# Patient Record
Sex: MALE | Race: WHITE
[De-identification: names, ages, dates, MRNs, and addresses within clinical notes are randomized per-mention and may not be internally consistent; named-entity substitution may affect disease eponyms.]

---

## 2018-04-05 ENCOUNTER — TRANSCRIPTION ENCOUNTER (OUTPATIENT)
Age: 43
End: 2018-04-05

## 2018-07-09 ENCOUNTER — EMERGENCY (EMERGENCY)
Facility: HOSPITAL | Age: 43
LOS: 0 days | Discharge: HOME | End: 2018-07-09
Attending: EMERGENCY MEDICINE | Admitting: EMERGENCY MEDICINE

## 2018-07-09 VITALS
OXYGEN SATURATION: 96 % | SYSTOLIC BLOOD PRESSURE: 140 MMHG | HEART RATE: 99 BPM | HEIGHT: 74 IN | WEIGHT: 229.94 LBS | DIASTOLIC BLOOD PRESSURE: 90 MMHG | TEMPERATURE: 96 F | RESPIRATION RATE: 20 BRPM

## 2018-07-09 VITALS
OXYGEN SATURATION: 98 % | SYSTOLIC BLOOD PRESSURE: 135 MMHG | DIASTOLIC BLOOD PRESSURE: 80 MMHG | RESPIRATION RATE: 18 BRPM

## 2018-07-09 DIAGNOSIS — F41.9 ANXIETY DISORDER, UNSPECIFIED: ICD-10-CM

## 2018-07-09 RX ORDER — HYDROXYZINE HCL 10 MG
2 TABLET ORAL
Qty: 20 | Refills: 0
Start: 2018-07-09 | End: 2018-07-11

## 2018-07-09 NOTE — ED PROVIDER NOTE - ATTENDING CONTRIBUTION TO CARE
I personally evaluated the patient. I reviewed the Resident’s or Physician Assistant’s note (as assigned above), and agree with the findings and plan except as documented in my note.  43y male no cardiac or PE RF with anxiety after coworker laid off, fears he is next, episodes assoc with tightness in chest and palpitations, no SI/HI/hallucinations, on exam vital signs appreciated, appears anxious, head nc/at, perrla, neck supple no goiter, cor rrr no m/r/g, lungs cta, abd snt, calves nontender, neuro itnact, EKG unremarkable, will d/c with vistaril to f/u with PMD for therapy referral. Patient counseled regarding conditions which should prompt return.

## 2018-07-09 NOTE — ED PROVIDER NOTE - NS ED ROS FT
Review of Systems:  	•	CONSTITUTIONAL - no fever, no diaphoresis, no chills  	•	SKIN - no rash  	•	HEMATOLOGIC - no bleeding, no bruising  	•	EYES - no eye pain, no blurry vision  	•	ENT - no congestion  	•	RESPIRATORY - no shortness of breath, no cough  	•	CARDIAC - no chest pain, + palpitations  	•	GI - no abd pain, no nausea, no vomiting, no diarrhea, no constipation  	•	GENITO-URINARY - no dysuria  	•	MUSCULOSKELETAL - no joint paint, no swelling, no redness  	•	NEUROLOGIC - no weakness, no headache, no paresthesias, no LOC  	•	PSYCH - + anxiety

## 2018-07-09 NOTE — ED ADULT TRIAGE NOTE - CHIEF COMPLAINT QUOTE
pt states he had anxiety attack , states after an incident at work 3 weeks ago he's having them frequently

## 2018-07-09 NOTE — ED PROVIDER NOTE - PHYSICAL EXAMINATION
VITAL SIGNS: I have reviewed nursing notes and confirm.  CONSTITUTIONAL: Well-developed; well-nourished; in no acute distress.  SKIN: Skin exam is warm and dry, no acute rash.  HEAD: Normocephalic; atraumatic.  EYES: PERRL, EOM intact; conjunctiva and sclera clear.  ENT: No nasal discharge; airway clear.   NECK: Supple; non tender.  CARD: S1, S2 normal; no murmurs, gallops, or rubs. Regular rate and rhythm.  RESP: Clear to auscultation bilaterally. No wheezes, rales or rhonchi.  ABD: Normal bowel sounds; soft; non-distended; non-tender.   EXT: Normal ROM. No edema. No calf tenderness.   LYMPH: No acute cervical adenopathy.  NEURO: Alert, oriented. Grossly unremarkable. No focal deficits.  PSYCH: Cooperative however appears very anxious.

## 2018-07-09 NOTE — ED PROVIDER NOTE - OBJECTIVE STATEMENT
44 yo M with no pmhx presenting with anxiety and palpations for the past 3 weeks. Patient states he started feeling this way after an incident at work 3 weeks ago. Patient states during the night he is not able to sleep. States that he felt as if his heart was racing prior to coming to ED. No cp, sob, fever, chills, abdominal pain, nausea, vomiting, diarrhea, back pain, urinary symptoms, headache, dizziness, paresthesias, or weakness. No leg swelling, calf pain, history of blood clots, hormonal use, hemoptysis, recent travel, or recent surgeries.

## 2021-01-26 ENCOUNTER — TRANSCRIPTION ENCOUNTER (OUTPATIENT)
Age: 46
End: 2021-01-26

## 2021-02-23 ENCOUNTER — TRANSCRIPTION ENCOUNTER (OUTPATIENT)
Age: 46
End: 2021-02-23

## 2021-08-19 ENCOUNTER — EMERGENCY (EMERGENCY)
Facility: HOSPITAL | Age: 46
LOS: 0 days | Discharge: HOME | End: 2021-08-19
Attending: EMERGENCY MEDICINE | Admitting: EMERGENCY MEDICINE
Payer: COMMERCIAL

## 2021-08-19 VITALS
HEART RATE: 68 BPM | OXYGEN SATURATION: 98 % | WEIGHT: 220.02 LBS | TEMPERATURE: 98 F | DIASTOLIC BLOOD PRESSURE: 79 MMHG | RESPIRATION RATE: 18 BRPM | SYSTOLIC BLOOD PRESSURE: 133 MMHG | HEIGHT: 74 IN

## 2021-08-19 DIAGNOSIS — F41.9 ANXIETY DISORDER, UNSPECIFIED: ICD-10-CM

## 2021-08-19 DIAGNOSIS — M79.661 PAIN IN RIGHT LOWER LEG: ICD-10-CM

## 2021-08-19 DIAGNOSIS — M54.5 LOW BACK PAIN: ICD-10-CM

## 2021-08-19 DIAGNOSIS — R26.89 OTHER ABNORMALITIES OF GAIT AND MOBILITY: ICD-10-CM

## 2021-08-19 PROCEDURE — 99284 EMERGENCY DEPT VISIT MOD MDM: CPT

## 2021-08-19 PROCEDURE — 93970 EXTREMITY STUDY: CPT | Mod: 26

## 2021-08-19 NOTE — ED PROVIDER NOTE - PLAN OF CARE
You were assessed for back and leg pain. A duplex of your Leg was negative for any clots. You will be discharged with a few days of anti-inflammatory medication. Please follow up with your primary doctor and neurosurgery

## 2021-08-19 NOTE — ED PROVIDER NOTE - PROGRESS NOTE DETAILS
ATTENDING NOTE: 47 y/o M with a tender R calf. Pt has a history of lumbar disc disease confirmed by MRI report reviews. There is calf tenderness without swelling. Distal n/v intact. + b/l straight leg raise test. + L sciatic notch tenderness. Doppler is negative. Stable for outpatient follow up.

## 2021-08-19 NOTE — ED PROVIDER NOTE - CARE PROVIDERS DIRECT ADDRESSES
[email or web address]

## 2021-08-19 NOTE — ED PROVIDER NOTE - PROVIDER TOKENS
PROVIDER:[TOKEN:[[id number]],FOLLOWUP:[4-6 Days]],PROVIDER:[TOKEN:[[id number]],FOLLOWUP:[7-10 Days]]

## 2021-08-19 NOTE — ED PROVIDER NOTE - PATIENT PORTAL LINK FT
You can access the FollowMyHealth Patient Portal offered by [facility] by registering at the following website: [email or web address]. By joining [employer]’s FollowMyHealth portal, you will also be able to view your health information using other applications (apps) compatible with our system.

## 2021-08-19 NOTE — ED PROVIDER NOTE - NS ED ROS FT
General: No fevers, chills, weight changes	  Skin/Breast: No skin rashes or wounds  Ophthalmologic: No blurry vision, double vision, recent changes in vision  ENMT: No difficulty hearing, ringing in ears, nasal discharge, throat pain, difficulty swallowing  Respiratory and Thorax: No coughing, wheezing, shortness of breath  Cardiovascular: No chest pain, palpitations  Gastrointestinal: No abdominal pain, constipation, diarrhea, nausea, vomiting  Genitourinary: No dysuria, polyuria, pyuria, hematuria  Musculoskeletal: No muscle aches or joint aches  Neurological: Right calf pain and tightness   Psychiatric: Regular mood  Hematology/Lymphatics: No easy bruising	  Endocrine: No hot or cold intolerance

## 2021-08-19 NOTE — ED PROVIDER NOTE - CARE PLAN
Principal Discharge DX:	Back pain  Assessment and plan of treatment:	You were assessed for back and leg pain. A duplex of your Leg was negative for any clots. You will be discharged with a few days of anti-inflammatory medication. Please follow up with your primary doctor and neurosurgery   1

## 2021-08-19 NOTE — ED PROVIDER NOTE - OBJECTIVE STATEMENT
This is a 46 year old male with PMHx of back pain who presented to the ED with 10 day history of Right calf pain. The patient states that a few days prior to this he had an MRI for his back pain which showed some L4-5 herniation and other bulges. The patient states that he has been having some pressure like pain in his Right calf. The patient states that the pain moves down his leg to his foot. Reports no difficulty in ambulating and no falls as a result of this. Remainder of review of systems otherwise negative.

## 2021-08-19 NOTE — ED PROVIDER NOTE - CARE PROVIDER_API CALL
Kizzy Jaramillo  FAMILY MEDICINE  [address] [place] [address]  Phone: [phone]  Fax: [phone]  Follow Up Time: 4-6 Days    Lois Berger)  Neurosurgery  [address] [place] [address], [address]  [place] [address]  Phone: [phone]  Fax: [phone]  Follow Up Time: 7-10 Days

## 2021-08-19 NOTE — ED PROVIDER NOTE - PHYSICAL EXAMINATION
GENERAL: NAD, well-developed  HEAD:  Atraumatic, Normocephalic  EYES: EOMI, PERRLA, conjunctiva and sclera clear  ENT:No nasal obstruction or discharge. No tonsillar exudate, swelling or erythema.  NECK: Supple, No JVD  CHEST/LUNG: Clear to auscultation bilaterally; No wheeze  HEART: Regular rate and rhythm; No murmurs, rubs, or gallops  ABDOMEN: Soft, Nontender, Nondistended; Bowel sounds present  EXTREMITIES:  2+ Peripheral Pulses, No clubbing, cyanosis, or edema. Right calf with no tightness but mild tenderness on palpation   PSYCH: AAOx3  NEUROLOGY: non-focal, sensation equal and intact bilaterally, cranial nerves ii-xii grossly intact, muscle strength 5/5 bilaterally  SKIN: No rashes or lesions

## 2021-09-08 ENCOUNTER — EMERGENCY (EMERGENCY)
Facility: HOSPITAL | Age: 46
LOS: 0 days | Discharge: HOME | End: 2021-09-08
Attending: STUDENT IN AN ORGANIZED HEALTH CARE EDUCATION/TRAINING PROGRAM | Admitting: STUDENT IN AN ORGANIZED HEALTH CARE EDUCATION/TRAINING PROGRAM
Payer: COMMERCIAL

## 2021-09-08 VITALS — WEIGHT: 220.02 LBS | HEIGHT: 74 IN

## 2021-09-08 VITALS
TEMPERATURE: 97 F | DIASTOLIC BLOOD PRESSURE: 70 MMHG | WEIGHT: 220.02 LBS | RESPIRATION RATE: 18 BRPM | HEIGHT: 74 IN | HEART RATE: 66 BPM | SYSTOLIC BLOOD PRESSURE: 130 MMHG | OXYGEN SATURATION: 98 %

## 2021-09-08 DIAGNOSIS — M79.662 PAIN IN LEFT LOWER LEG: ICD-10-CM

## 2021-09-08 DIAGNOSIS — F41.9 ANXIETY DISORDER, UNSPECIFIED: ICD-10-CM

## 2021-09-08 DIAGNOSIS — M54.9 DORSALGIA, UNSPECIFIED: ICD-10-CM

## 2021-09-08 DIAGNOSIS — M54.5 LOW BACK PAIN: ICD-10-CM

## 2021-09-08 PROCEDURE — 99284 EMERGENCY DEPT VISIT MOD MDM: CPT

## 2021-09-08 PROCEDURE — 93970 EXTREMITY STUDY: CPT | Mod: 26

## 2021-09-08 RX ORDER — KETOROLAC TROMETHAMINE 30 MG/ML
30 SYRINGE (ML) INJECTION ONCE
Refills: 0 | Status: DISCONTINUED | OUTPATIENT
Start: 2021-09-08 | End: 2021-09-08

## 2021-09-08 RX ORDER — DEXAMETHASONE 0.5 MG/5ML
10 ELIXIR ORAL ONCE
Refills: 0 | Status: COMPLETED | OUTPATIENT
Start: 2021-09-08 | End: 2021-09-08

## 2021-09-08 NOTE — ED PROVIDER NOTE - PATIENT PORTAL LINK FT
You can access the FollowMyHealth Patient Portal offered by [facility] by registering at the following website: [email or web address]. By joining [employer]’s FollowMyHealth portal, you will also be able to view your health information using other applications (apps) compatible with our system.

## 2021-09-08 NOTE — ED PROVIDER NOTE - ATTENDING CONTRIBUTION TO CARE
I personally evaluated the patient. I reviewed the Resident’s or Physician Assistant’s note (as assigned above), and agree with the findings and plan except as documented in my note.  46 year old male presents to the ED c/o back pain and leg pain. Patient has a history of herniation to L4/L5 and has been following with a chiropractor. Due to pain in the left leg patient was sent in by chiropractor to r/o dvt. Patient has not been taking any medication for pain, was  seen in the ed previously and sent on NSAIDS however did not  the prescription. No fever chills numbness/tingling urinary frequency urency burning saddle anesthesia or history of IVDA or trauma.  on exam  CONSTITUTIONAL: WA / WN / NAD  HEAD: NCAT  EYES: PERRL; EOMI;   ENT: Normal pharynx; mucous membranes pink/moist, no erythema.  NECK: Supple; te and effort are normal; breath sounds clear and equal bilaterally.  ABD: Soft, NT ND   MSK/EXT: No gross deformities; full range of motion. NO midline CTLS ttp b/ PSIS ttp. b/l distal pulses in tact.  SKIN: Warm and dry;   NEURO: AAOx3, Motor 5/5   PSYCH: Memory Intact, Normal Affect

## 2021-09-08 NOTE — ED PROVIDER NOTE - PHYSICAL EXAMINATION
--EXAM--  VITAL SIGNS: I have reviewed vs documented at present.  CONSTITUTIONAL: Well-developed; well-nourished; in no acute distress.     NECK: Supple; non tender.  CARD: S1, S2, Regular rate and rhythm.   RESP: No wheezes, rales or rhonchi.  back no midline spinal tenderness there is paraspinal tenderness lower left side . positive straight leg test on left    left calf no swelling no tenderness  NEURO: Alert, oriented, grossly unremarkable. Strength 5/5 in all extremities. Sensation intact throughout.  PSYCH: Cooperative, appropriate.

## 2021-09-08 NOTE — ED PROVIDER NOTE - NSFOLLOWUPCLINICS_GEN_ALL_ED_FT
[facility]  Medicine  [address], [place]   Phone: [phone]  Fax:

## 2021-09-08 NOTE — ED PROVIDER NOTE - NS ED ROS FT
Review of Systems:  	•	CONSTITUTIONAL - no fever, no diaphoresis, no chills  	•	SKIN - no rash    	•	RESPIRATORY - no shortness of breath, no cough  	•	CARDIAC - no chest pain, no palpitations    	•	MUSCULOSKELETAL - back pain  no joint paint, no swelling, no redness  	•	NEUROLOGIC - no weakness, no headache, no paresthesias, no LOC  	•	PSYCH - no anxiety, non suicidal, non homicidal, no hallucination, no depression

## 2021-09-08 NOTE — ED PROVIDER NOTE - CLINICAL SUMMARY MEDICAL DECISION MAKING FREE TEXT BOX
46 year old male presents to the ED c/o back pain and leg pain. Patient has a history of herniation to L4/L5 and has been following with a chiropractor. Due to pain in the left leg patient was sent in by chiropractor to r/o dvt. Patient has not been taking any medication for pain, was  seen in the ed previously and sent on NSAIDS however did not  the prescription. No fever chills numbness/tingling urinary frequency urgency burning saddle anesthesia or history of IVDA or trauma. VS reviewed pain medication given. Duplex negative. Patient a spoken to in detail about results  All questions addressed Strict Return precautions given.

## 2021-09-08 NOTE — ED PROVIDER NOTE - CARE PROVIDER_API CALL
Asif Colvin [facility]  [address] [address]  Phone: [phone]  Fax: [phone]  Follow Up Time:

## 2021-09-08 NOTE — ED PROVIDER NOTE - OBJECTIVE STATEMENT
this is 47 yo male presents to ed for evaluation of lower leg pain. patient has history of back pain and mri shows l4 to l5 herniation. patient states that he is under care of chiropractor and he was sent here to have ultrasound to r/o dvt. patient is due to see pain management  end of september.

## 2021-09-09 ENCOUNTER — INPATIENT (INPATIENT)
Facility: HOSPITAL | Age: 46
LOS: 3 days | Discharge: HOME | End: 2021-09-13
Attending: FAMILY MEDICINE | Admitting: FAMILY MEDICINE
Payer: COMMERCIAL

## 2021-09-09 VITALS
TEMPERATURE: 99 F | WEIGHT: 199.96 LBS | HEIGHT: 74 IN | HEART RATE: 74 BPM | RESPIRATION RATE: 18 BRPM | SYSTOLIC BLOOD PRESSURE: 145 MMHG | OXYGEN SATURATION: 99 % | DIASTOLIC BLOOD PRESSURE: 84 MMHG

## 2021-09-09 LAB
ALBUMIN SERPL ELPH-MCNC: 4.7 G/DL — SIGNIFICANT CHANGE UP (ref 3.5–5.2)
ALP SERPL-CCNC: 69 U/L — SIGNIFICANT CHANGE UP (ref 30–115)
ALT FLD-CCNC: 32 U/L — SIGNIFICANT CHANGE UP (ref 0–41)
ANION GAP SERPL CALC-SCNC: 12 MMOL/L — SIGNIFICANT CHANGE UP (ref 7–14)
AST SERPL-CCNC: 18 U/L — SIGNIFICANT CHANGE UP (ref 0–41)
BASOPHILS # BLD AUTO: 0.06 K/UL — SIGNIFICANT CHANGE UP (ref 0–0.2)
BASOPHILS NFR BLD AUTO: 0.6 % — SIGNIFICANT CHANGE UP (ref 0–1)
BILIRUB SERPL-MCNC: 0.4 MG/DL — SIGNIFICANT CHANGE UP (ref 0.2–1.2)
BUN SERPL-MCNC: 18 MG/DL — SIGNIFICANT CHANGE UP (ref 10–20)
CALCIUM SERPL-MCNC: 9.7 MG/DL — SIGNIFICANT CHANGE UP (ref 8.5–10.1)
CHLORIDE SERPL-SCNC: 105 MMOL/L — SIGNIFICANT CHANGE UP (ref 98–110)
CO2 SERPL-SCNC: 22 MMOL/L — SIGNIFICANT CHANGE UP (ref 17–32)
CREAT SERPL-MCNC: 0.8 MG/DL — SIGNIFICANT CHANGE UP (ref 0.7–1.5)
EOSINOPHIL # BLD AUTO: 0.49 K/UL — SIGNIFICANT CHANGE UP (ref 0–0.7)
EOSINOPHIL NFR BLD AUTO: 5.2 % — SIGNIFICANT CHANGE UP (ref 0–8)
GLUCOSE SERPL-MCNC: 84 MG/DL — SIGNIFICANT CHANGE UP (ref 70–99)
HCT VFR BLD CALC: 43.9 % — SIGNIFICANT CHANGE UP (ref 42–52)
HGB BLD-MCNC: 14.7 G/DL — SIGNIFICANT CHANGE UP (ref 14–18)
IMM GRANULOCYTES NFR BLD AUTO: 0.3 % — SIGNIFICANT CHANGE UP (ref 0.1–0.3)
LYMPHOCYTES # BLD AUTO: 1.77 K/UL — SIGNIFICANT CHANGE UP (ref 1.2–3.4)
LYMPHOCYTES # BLD AUTO: 18.8 % — LOW (ref 20.5–51.1)
MCHC RBC-ENTMCNC: 29.1 PG — SIGNIFICANT CHANGE UP (ref 27–31)
MCHC RBC-ENTMCNC: 33.5 G/DL — SIGNIFICANT CHANGE UP (ref 32–37)
MCV RBC AUTO: 86.8 FL — SIGNIFICANT CHANGE UP (ref 80–94)
MONOCYTES # BLD AUTO: 0.63 K/UL — HIGH (ref 0.1–0.6)
MONOCYTES NFR BLD AUTO: 6.7 % — SIGNIFICANT CHANGE UP (ref 1.7–9.3)
NEUTROPHILS # BLD AUTO: 6.41 K/UL — SIGNIFICANT CHANGE UP (ref 1.4–6.5)
NEUTROPHILS NFR BLD AUTO: 68.4 % — SIGNIFICANT CHANGE UP (ref 42.2–75.2)
NRBC # BLD: 0 /100 WBCS — SIGNIFICANT CHANGE UP (ref 0–0)
PLATELET # BLD AUTO: 267 K/UL — SIGNIFICANT CHANGE UP (ref 130–400)
POTASSIUM SERPL-MCNC: 4.2 MMOL/L — SIGNIFICANT CHANGE UP (ref 3.5–5)
POTASSIUM SERPL-SCNC: 4.2 MMOL/L — SIGNIFICANT CHANGE UP (ref 3.5–5)
PROT SERPL-MCNC: 6.7 G/DL — SIGNIFICANT CHANGE UP (ref 6–8)
RBC # BLD: 5.06 M/UL — SIGNIFICANT CHANGE UP (ref 4.7–6.1)
RBC # FLD: 13.2 % — SIGNIFICANT CHANGE UP (ref 11.5–14.5)
SARS-COV-2 RNA SPEC QL NAA+PROBE: SIGNIFICANT CHANGE UP
SODIUM SERPL-SCNC: 139 MMOL/L — SIGNIFICANT CHANGE UP (ref 135–146)
WBC # BLD: 9.39 K/UL — SIGNIFICANT CHANGE UP (ref 4.8–10.8)
WBC # FLD AUTO: 9.39 K/UL — SIGNIFICANT CHANGE UP (ref 4.8–10.8)

## 2021-09-09 PROCEDURE — 72110 X-RAY EXAM L-2 SPINE 4/>VWS: CPT | Mod: 26

## 2021-09-09 PROCEDURE — 99285 EMERGENCY DEPT VISIT HI MDM: CPT

## 2021-09-09 PROCEDURE — 99223 1ST HOSP IP/OBS HIGH 75: CPT

## 2021-09-09 RX ORDER — INFLUENZA VIRUS VACCINE 15; 15; 15; 15 UG/.5ML; UG/.5ML; UG/.5ML; UG/.5ML
0.5 SUSPENSION INTRAMUSCULAR ONCE
Refills: 0 | Status: COMPLETED | OUTPATIENT
Start: 2021-09-09 | End: 2021-09-09

## 2021-09-09 RX ORDER — TRAMADOL HYDROCHLORIDE 50 MG/1
50 TABLET ORAL EVERY 8 HOURS
Refills: 0 | Status: DISCONTINUED | OUTPATIENT
Start: 2021-09-09 | End: 2021-09-13

## 2021-09-09 RX ORDER — CHLORHEXIDINE GLUCONATE 213 G/1000ML
1 SOLUTION TOPICAL
Refills: 0 | Status: DISCONTINUED | OUTPATIENT
Start: 2021-09-09 | End: 2021-09-13

## 2021-09-09 RX ORDER — ENOXAPARIN SODIUM 100 MG/ML
40 INJECTION SUBCUTANEOUS DAILY
Refills: 0 | Status: DISCONTINUED | OUTPATIENT
Start: 2021-09-09 | End: 2021-09-13

## 2021-09-09 RX ORDER — KETOROLAC TROMETHAMINE 30 MG/ML
30 SYRINGE (ML) INJECTION ONCE
Refills: 0 | Status: DISCONTINUED | OUTPATIENT
Start: 2021-09-09 | End: 2021-09-09

## 2021-09-09 RX ORDER — ACETAMINOPHEN 500 MG
650 TABLET ORAL EVERY 6 HOURS
Refills: 0 | Status: DISCONTINUED | OUTPATIENT
Start: 2021-09-09 | End: 2021-09-13

## 2021-09-09 RX ADMIN — Medication 30 MILLIGRAM(S): at 09:49

## 2021-09-09 RX ADMIN — Medication 40 MILLIGRAM(S): at 16:17

## 2021-09-09 RX ADMIN — Medication 650 MILLIGRAM(S): at 20:00

## 2021-09-09 RX ADMIN — Medication 650 MILLIGRAM(S): at 19:26

## 2021-09-09 NOTE — PATIENT PROFILE ADULT - CHOOSE INDICATION TO IMMUNIZE (AN ORDER WILL BE GENERATED WHEN THIS NOTE IS SAVED):
Date: 6/24/2024    Patient Name: Bryant Mariscal          To Whom it may concern:    This letter has been written at the patient's request. The above patient was seen at [facility] for treatment of a medical condition.    May use the left upper extremity with 5 lb restriction. No driving the work vehicle at this time.     Sincerely,        Ede Sarmiento MD  Knee, Shoulder, & Elbow Surgery / Sports Medicine Specialist  Orthopaedic Surgery  [address] [place] [address].[email or web address]  [email or web address]  t: [phone]  o: [phone]  f: [phone]      
Patient is not pregnant (male or female)

## 2021-09-09 NOTE — ED PROVIDER NOTE - NS ED MD EM SELECTION
I have reviewed and confirmed nurses' notes for patient's medications, allergies, medical history, and surgical history. [id number] Detailed

## 2021-09-09 NOTE — ED PROVIDER NOTE - NS ED ROS FT
Constitutional: (-) fever  Eyes/ENT: (-) blurry vision, (-) epistaxis  Cardiovascular: (-) chest pain, (-) syncope  Respiratory: (-) cough, (-) shortness of breath  Gastrointestinal: (-) vomiting, (-) diarrhea  Musculoskeletal: (-) neck pain, see HPI  Integumentary: (-) rash, (-) edema  Neurological: (-) headache, (-) altered mental status  Psychiatric: (-) hallucinations  Allergic/Immunologic: (-) pruritus

## 2021-09-09 NOTE — ED ADULT NURSE NOTE - NSIMPLEMENTINTERV_GEN_ALL_ED
Ninfa Macaroi MD  Signing Provider: MD ROLAND Garza/RED ([id number])  DD: 06/19/2017 07:52:48 TD: 06/20/2017 02:47:42    Copy Sent To:   
Implemented All Universal Safety Interventions:  Aladdin to call system. Call bell, personal items and telephone within reach. Instruct patient to call for assistance. Room bathroom lighting operational. Non-slip footwear when patient is off stretcher. Physically safe environment: no spills, clutter or unnecessary equipment. Stretcher in lowest position, wheels locked, appropriate side rails in place.

## 2021-09-09 NOTE — ED PROVIDER NOTE - PROGRESS NOTE DETAILS
patient refuses morphine, unable to ambulate, plan for admission, will start with toradol (last too alleve last night) pt unable to ambulate, will admit for rehab

## 2021-09-09 NOTE — H&P ADULT - HISTORY OF PRESENT ILLNESS
A 47 y/o male with pmhx of lower back pain/sciatica , herniated disc L4-L5  presents with lower back and left leg pain since Monday. Pt reports pain started while he was sitting , initially on the lower back, then left gluteus and now mostly on left lower extremity. Pt reports pain mostly with ambulation when he puts his foot down. Pt reports numbness on his left foot to all toes. PT denies weakness. Pt reports can not straighten his left knee due to pain. Pt denies hx of injury. Pt had MRI on July 2021 showed L4-L5 herniated disc and followed up with a chiropractor. Pt has been taking Tylenol and has been helping. Pt now is not able to ambulate and straighten up. Pt denies seeing PCP in a year. Pt preferred not to disclose what he works, reports , sitting a lot , no heavy lifting. PT seen in ED yesterday for calf pain and August 19 , duplex was negative for DVT Pt denies fever, chills, chest pain, shortness of breath, burning with urination, diarrhea.

## 2021-09-09 NOTE — ED PROVIDER NOTE - CLINICAL SUMMARY MEDICAL DECISION MAKING FREE TEXT BOX
pt with h/o lumbar disc dz with pain mostly in left calf, referred, unable to ambulate, neurologically intact

## 2021-09-09 NOTE — H&P ADULT - NSHPPHYSICALEXAM_GEN_ALL_CORE
VITALS:     ICU Vital Signs Last 24 Hrs  T(C): 37.1 (09 Sep 2021 08:53), Max: 37.1 (09 Sep 2021 08:53)  T(F): 98.8 (09 Sep 2021 08:53), Max: 98.8 (09 Sep 2021 08:53)  HR: 74 (09 Sep 2021 08:53) (74 - 74)  BP: 145/84 (09 Sep 2021 08:53) (145/84 - 145/84)  RR: 18 (09 Sep 2021 08:53) (18 - 18)  SpO2: 99% (09 Sep 2021 08:53) (99% - 99%)        GENERAL: NAD, sitting in chair   HEAD:  Atraumatic, Normocephalic  EYES: EOMI, PERRLA, conjunctiva and sclera clear  ENT: Moist mucous membranes  NECK: Supple, No JVD  CHEST/LUNG: Clear to auscultation bilaterally; No rales, rhonchi, wheezing, or rubs.   HEART: Regular rate and rhythm; No murmurs, rubs, or gallops  ABDOMEN:  Soft, Nontender, Nondistended. No hepatomegaly  Back: no tenderness to palpation, no sign of injury, no palpable mass   EXTREMITIES: pt unable to extend left leg completely, left leg strenght 5/5, pt able to press with foot, wiggle toes, sensation intact, DP/PT present  RLE exam intact. Pt reports back pain with right leg straight leg rise. no calf tenderness, edema. Left shin bruising, tender to palpation 3 x 3 cm  NERVOUS SYSTEM:  Alert & Oriented X3, speech clear. No deficits   MSK: FROM all 4 extremities, full and equal strength  SKIN: No rashes or lesions VITALS:     ICU Vital Signs Last 24 Hrs  T(C): 37.1 (09 Sep 2021 08:53), Max: 37.1 (09 Sep 2021 08:53)  T(F): 98.8 (09 Sep 2021 08:53), Max: 98.8 (09 Sep 2021 08:53)  HR: 74 (09 Sep 2021 08:53) (74 - 74)  BP: 145/84 (09 Sep 2021 08:53) (145/84 - 145/84)  RR: 18 (09 Sep 2021 08:53) (18 - 18)  SpO2: 99% (09 Sep 2021 08:53) (99% - 99%)        GENERAL: NAD, sitting in chair   HEAD:  Atraumatic, Normocephalic  EYES: EOMI, PERRLA, conjunctiva and sclera clear  ENT: Moist mucous membranes  NECK: Supple, No JVD  CHEST/LUNG: Clear to auscultation bilaterally; No rales, rhonchi, wheezing, or rubs.   HEART: Regular rate and rhythm; No murmurs, rubs, or gallops  ABDOMEN:  Soft, Nontender, Nondistended. No hepatomegaly  Back: no tenderness to palpation, no sign of injury, no palpable mass   EXTREMITIES: pt unable to extend left leg completely, left leg strenght 5/5, pt able to press with foot, wiggle toes, sensation intact, DP/PT present  RLE exam intact. Pt reports back pain with right leg straight leg rise. no calf tenderness, edema. Left shin bruising, tender to palpation 3 x 3 cm  NERVOUS SYSTEM:  Alert & Oriented X3, speech clear. No deficits. +motor/sensory intact  MSK: FROM all 4 extremities, full and equal strength  SKIN: No rashes or lesions

## 2021-09-09 NOTE — ED PROVIDER NOTE - OBJECTIVE STATEMENT
46y male poor historian chart review reveals h/o herniated discs pending pain management irina seen yesterday for calf pain had negative duplex given naproxen apparently offered admission for pain control but discharged returns for admission, unable to bear weight on LLE, has intermittent RLE pain and left low back pain as well, no bowel/bladder symptoms, no fever

## 2021-09-09 NOTE — H&P ADULT - NSHPLABSRESULTS_GEN_ALL_CORE
14.7   9.39  )-----------( 267      ( 09 Sep 2021 09:45 )             43.9       09-09    139  |  105  |  18  ----------------------------<  84  4.2   |  22  |  0.8    Ca    9.7      09 Sep 2021 09:45    TPro  6.7  /  Alb  4.7  /  TBili  0.4  /  DBili  x   /  AST  18  /  ALT  32  /  AlkPhos  69  09-09        < from: Xray Lumbosacral Spine (09.09.21 @ 12:46) >        < end of copied text >    < from: [facility] Duplex Lower Ext Vein Scan, Bilat (09.08.21 @ 10:32) >    Impression:    No evidence of deep venous thrombosis or superficial thrombophlebitis in the bilateral lower extremities.    ICD-10:M79.89    --- End of Report ---      < from: Xray Lumbosacral Spine (09.09.21 @ 12:46) >    impression:    No evidence of compression deformity or subluxation.    Disc space narrowing at L4-L5 and L5-S1.    Multilevel osteophyte formation.    --- End of Report ---        MARYANNE JOAQUIN MD; Attending Radiologist  This document has been electronically signed. Sep  9 2021  1:17PM    < end of copied text >

## 2021-09-09 NOTE — ED PROVIDER NOTE - PHYSICAL EXAMINATION
Vital Signs: I have reviewed the initial vital signs.  Constitutional: well-nourished, appears stated age, no acute distress  HEENT: NC/AT, PERRLA, EOMI, conjunctivae pink, sclerae anicteric, mmm   Neck: supple,  no goiter, no meningismus  Cardiovascular: RRR no m/r/g  Respiratory: CTA no w/r/r  Gastrointestinal: +bs, snt/nd  Musculoskeletal: FROM x 4 with ttp left calf, no sciatic ttp today but +contralateral SLR, NVI  Integumentary: warm, dry, no rash  Neurologic: awake, alert, cranial nerves II-XII grossly intact, extremities’ motor and sensory functions grossly intact  Psychiatric: appropriate mood, appropriate affect

## 2021-09-09 NOTE — PATIENT PROFILE ADULT - LIVING ENVIRONMENT
Contacted. Information provided. Placed on Hold with 9 cases ahead of this one. I could not continue to hold any longer over 15mins now. Will attempt to leave a message for them to get back to me at another time. no

## 2021-09-10 LAB
ANION GAP SERPL CALC-SCNC: 12 MMOL/L — SIGNIFICANT CHANGE UP (ref 7–14)
BUN SERPL-MCNC: 12 MG/DL — SIGNIFICANT CHANGE UP (ref 10–20)
CALCIUM SERPL-MCNC: 9.7 MG/DL — SIGNIFICANT CHANGE UP (ref 8.5–10.1)
CHLORIDE SERPL-SCNC: 103 MMOL/L — SIGNIFICANT CHANGE UP (ref 98–110)
CO2 SERPL-SCNC: 26 MMOL/L — SIGNIFICANT CHANGE UP (ref 17–32)
CREAT SERPL-MCNC: 0.8 MG/DL — SIGNIFICANT CHANGE UP (ref 0.7–1.5)
GLUCOSE SERPL-MCNC: 78 MG/DL — SIGNIFICANT CHANGE UP (ref 70–99)
HCT VFR BLD CALC: 43.3 % — SIGNIFICANT CHANGE UP (ref 42–52)
HGB BLD-MCNC: 14.1 G/DL — SIGNIFICANT CHANGE UP (ref 14–18)
MCHC RBC-ENTMCNC: 28.8 PG — SIGNIFICANT CHANGE UP (ref 27–31)
MCHC RBC-ENTMCNC: 32.6 G/DL — SIGNIFICANT CHANGE UP (ref 32–37)
MCV RBC AUTO: 88.4 FL — SIGNIFICANT CHANGE UP (ref 80–94)
NRBC # BLD: 0 /100 WBCS — SIGNIFICANT CHANGE UP (ref 0–0)
PLATELET # BLD AUTO: 262 K/UL — SIGNIFICANT CHANGE UP (ref 130–400)
POTASSIUM SERPL-MCNC: 4 MMOL/L — SIGNIFICANT CHANGE UP (ref 3.5–5)
POTASSIUM SERPL-SCNC: 4 MMOL/L — SIGNIFICANT CHANGE UP (ref 3.5–5)
RBC # BLD: 4.9 M/UL — SIGNIFICANT CHANGE UP (ref 4.7–6.1)
RBC # FLD: 13.1 % — SIGNIFICANT CHANGE UP (ref 11.5–14.5)
SODIUM SERPL-SCNC: 141 MMOL/L — SIGNIFICANT CHANGE UP (ref 135–146)
WBC # BLD: 9.85 K/UL — SIGNIFICANT CHANGE UP (ref 4.8–10.8)
WBC # FLD AUTO: 9.85 K/UL — SIGNIFICANT CHANGE UP (ref 4.8–10.8)

## 2021-09-10 PROCEDURE — 73600 X-RAY EXAM OF ANKLE: CPT | Mod: 26,LT

## 2021-09-10 PROCEDURE — 99232 SBSQ HOSP IP/OBS MODERATE 35: CPT

## 2021-09-10 PROCEDURE — 73590 X-RAY EXAM OF LOWER LEG: CPT | Mod: 26,LT

## 2021-09-10 PROCEDURE — 72148 MRI LUMBAR SPINE W/O DYE: CPT | Mod: 26

## 2021-09-10 RX ADMIN — Medication 650 MILLIGRAM(S): at 02:55

## 2021-09-10 RX ADMIN — TRAMADOL HYDROCHLORIDE 50 MILLIGRAM(S): 50 TABLET ORAL at 06:26

## 2021-09-10 RX ADMIN — TRAMADOL HYDROCHLORIDE 50 MILLIGRAM(S): 50 TABLET ORAL at 22:37

## 2021-09-10 RX ADMIN — TRAMADOL HYDROCHLORIDE 50 MILLIGRAM(S): 50 TABLET ORAL at 21:49

## 2021-09-10 RX ADMIN — Medication 650 MILLIGRAM(S): at 02:25

## 2021-09-10 RX ADMIN — Medication 650 MILLIGRAM(S): at 09:39

## 2021-09-10 RX ADMIN — TRAMADOL HYDROCHLORIDE 50 MILLIGRAM(S): 50 TABLET ORAL at 04:17

## 2021-09-10 RX ADMIN — TRAMADOL HYDROCHLORIDE 50 MILLIGRAM(S): 50 TABLET ORAL at 13:32

## 2021-09-10 RX ADMIN — Medication 650 MILLIGRAM(S): at 10:09

## 2021-09-10 RX ADMIN — ENOXAPARIN SODIUM 40 MILLIGRAM(S): 100 INJECTION SUBCUTANEOUS at 13:00

## 2021-09-10 RX ADMIN — TRAMADOL HYDROCHLORIDE 50 MILLIGRAM(S): 50 TABLET ORAL at 13:02

## 2021-09-10 NOTE — PHYSICAL THERAPY INITIAL EVALUATION ADULT - ADDITIONAL COMMENTS
Pt reports he lives with mother in an apartment - 5 steps with rail to enter. Pt states he has history of sciatic/back pain but it usually resolves itself. Pt sees chiropractor regularly. Pt has a RW/SC from a family member.

## 2021-09-10 NOTE — PHYSICAL THERAPY INITIAL EVALUATION ADULT - GAIT DEVIATIONS NOTED, PT EVAL
forward flexed trunk, dec WB on LLE - only on toes/forefoot - LLE externally rotating ambulating on flexed L knee,/decreased nadeen/decreased step length/decreased stride length/decreased weight-shifting ability

## 2021-09-10 NOTE — PHYSICAL THERAPY INITIAL EVALUATION ADULT - GENERAL OBSERVATIONS, REHAB EVAL
Pt encountered semi-reclined in bed, NAD - Dr. Bean and Dr. Alves requesting pt be seen before MRI - no acute fx or trauma so cleared for PT. Pt agreeable; chart reviewed

## 2021-09-11 LAB
COVID-19 SPIKE DOMAIN AB INTERP: POSITIVE
COVID-19 SPIKE DOMAIN ANTIBODY RESULT: >250 U/ML — HIGH
SARS-COV-2 IGG+IGM SERPL QL IA: >250 U/ML — HIGH
SARS-COV-2 IGG+IGM SERPL QL IA: POSITIVE

## 2021-09-11 PROCEDURE — 99232 SBSQ HOSP IP/OBS MODERATE 35: CPT

## 2021-09-11 RX ADMIN — Medication 40 MILLIGRAM(S): at 12:13

## 2021-09-11 RX ADMIN — ENOXAPARIN SODIUM 40 MILLIGRAM(S): 100 INJECTION SUBCUTANEOUS at 12:13

## 2021-09-11 RX ADMIN — Medication 650 MILLIGRAM(S): at 06:50

## 2021-09-11 RX ADMIN — Medication 650 MILLIGRAM(S): at 06:04

## 2021-09-11 NOTE — CONSULT NOTE ADULT - SUBJECTIVE AND OBJECTIVE BOX
NEUROSURGERY CONSULT   KAYLYNN KHALIL   09-10-21 @ 18:29    HPI: A 47 y/o male with pmhx of lower back pain/sciatica , herniated disc L4-L5  presents with lower back and left leg pain since Monday. Pt reports pain started while he was sitting , initially on the lower back, then left gluteus and now mostly on left lower extremity. Pt reports pain mostly with ambulation when he puts his foot down. Pt reports numbness on his left foot to all toes. PT denies weakness. Pt reports can not straighten his left knee due to pain. Pt denies hx of injury. Pt had MRI on July 2021 showed L4-L5 herniated disc and followed up with a chiropractor. Pt has been taking Tylenol and has been helping. Pt now is not able to ambulate and straighten up. Pt denies seeing PCP in a year. Pt preferred not to disclose what he works, reports , sitting a lot , no heavy lifting. PT seen in ED yesterday for calf pain and August 19 , duplex was negative for DVT Pt denies fever, chills, chest pain, shortness of breath, burning with urination, diarrhea.     Neurosurgery was consulted on patient with known L4-L5 herniated disc from outpatient MRI seen by chiropractor. Patient states that pain started Monday, not brought on by any particular movement, and continues to have worsening pain with ambulation and movement now. Patient states that he has Lower back pain that radiates to his posterior left calf. States that he has normal sensation, and denies any urinary / bowel incontinence. Patient states that he would not like surgery and would like to try conservative management, and follow up outpatient.     RADIOLOGY:   Awaiting MRI results     MEDS:   acetaminophen   Tablet .. 650 milliGRAM(s) Oral every 6 hours PRN  chlorhexidine 4% Liquid 1 Application(s) Topical <User Schedule>  enoxaparin Injectable 40 milliGRAM(s) SubCutaneous daily  predniSONE   Tablet 50 milliGRAM(s) Oral once  traMADol 50 milliGRAM(s) Oral every 8 hours PRN      PHYSICAL EXAM:  Awake, alert, following commands   PERRL  B/L UE 5/5   RLE 5/5   L HF (+) SLR pain limited    L KF 4/5   L PF/DR/EHL- 4+/5 pain limited   SILT     Vital Signs Last 24 Hrs  T(C): 35.9 (10 Sep 2021 14:53), Max: 36.1 (09 Sep 2021 21:06)  T(F): 96.7 (10 Sep 2021 14:53), Max: 96.9 (09 Sep 2021 21:06)  HR: 64 (10 Sep 2021 14:53) (61 - 65)  BP: 97/69 (10 Sep 2021 14:53) (97/69 - 138/80)  BP(mean): --  RR: 16 (10 Sep 2021 14:53) (16 - 16)  SpO2: 97% (10 Sep 2021 13:36) (97% - 97%)      LABS:  [id number] @ 18:29                        14.1   9.85  )-----------( 262      ( 10 Sep 2021 07:05 )             43.3     09-10    141  |  103  |  12  ----------------------------<  78  4.0   |  26  |  0.8    Ca    9.7      10 Sep 2021 07:05    TPro  6.7  /  Alb  4.7  /  TBili  0.4  /  DBili  x   /  AST  18  /  ALT  32  /  AlkPhos  69  09-09            
   History of Present Illness:  Reason for Admission: lower back pain, left leg pain  History of Present Illness:   A 47 y/o male with pmhx of lower back pain/sciatica , herniated disc L4-L5  presents with lower back and left leg pain since Monday. Pt reports pain started while he was sitting , initially on the lower back, then left gluteus and now mostly on left lower extremity. Pt reports pain mostly with ambulation when he puts his foot down. Pt reports numbness on his left foot to all toes. PT denies weakness. Pt reports can not straighten his left knee due to pain. Pt denies hx of injury. Pt had MRI on July 2021 showed L4-L5 herniated disc and followed up with a chiropractor. Pt has been taking Tylenol and has been helping. Pt now is not able to ambulate and straighten up. Pt denies seeing PCP in a year. Pt preferred not to disclose what he works, reports , sitting a lot , no heavy lifting. PT seen in ED yesterday for calf pain and August 19 , duplex was negative for DVT Pt denies fever, chills, chest pain, shortness of breath, burning with urination, diarrhea.      Review of Systems:  Other Review of Systems: All other review of systems negative, except as noted in HPI      Allergies and Intolerances:        Allergies:  	No Known Allergies:     Home Medications:   * Patient Currently Takes Medications as of 08-Sep-2021 11:57 documented in Structured Notes  · 	naproxen 500 mg oral tablet: 1 tab(s) orally 2 times a day     Patient History:    Past Medical, Past Surgical, and Family History:  PAST MEDICAL HISTORY:  Anxiety     Lower back pain.     PAST SURGICAL HISTORY:  No significant past surgical history.     Social History:  Social History (marital status, living situation, occupation, tobacco use, alcohol and drug use, and sexual history): Tobacco use: denies   EtOH use: denies   Illicit drug use: denies     Tobacco Screening:  · Core Measure Site	No  · Has the patient used tobacco in the past 30 days?	No    Risk Assessment:    Present on Admission:  Deep Venous Thrombosis	no  Pulmonary Embolus	no     Heart Failure:  Does this patient have a history of or has been diagnosed with heart failure? no.    HIV Screen (per Health system Department of Health, HIV screening must be offered to every individual between ages 13 and 64)	Offered and patient declined    Physical Exam:   Physical Exam: VITALS:     ICU Vital Signs Last 24 Hrs  T(C): 37.1 (09 Sep 2021 08:53), Max: 37.1 (09 Sep 2021 08:53)  T(F): 98.8 (09 Sep 2021 08:53), Max: 98.8 (09 Sep 2021 08:53)  HR: 74 (09 Sep 2021 08:53) (74 - 74)  BP: 145/84 (09 Sep 2021 08:53) (145/84 - 145/84)  RR: 18 (09 Sep 2021 08:53) (18 - 18)  SpO2: 99% (09 Sep 2021 08:53) (99% - 99%)        GENERAL: NAD, sitting in chair   HEAD:  Atraumatic, Normocephalic  EYES: EOMI, PERRLA, conjunctiva and sclera clear  ENT: Moist mucous membranes  NECK: Supple, No JVD  CHEST/LUNG: Clear to auscultation bilaterally; No rales, rhonchi, wheezing, or rubs.   HEART: Regular rate and rhythm; No murmurs, rubs, or gallops  ABDOMEN:  Soft, Nontender, Nondistended. No hepatomegaly  Back: no tenderness to palpation, no sign of injury, no palpable mass   EXTREMITIES: pt unable to extend left leg completely, left leg strenght 5/5, pt able to press with foot, wiggle toes, sensation intact, DP/PT present  RLE exam intact. Pt reports back pain with right leg straight leg rise. no calf tenderness, edema. Left shin bruising, tender to palpation 3 x 3 cm  NERVOUS SYSTEM:  Alert & Oriented X3, speech clear. No deficits. +motor/sensory intact  MSK: FROM all 4 extremities, full and equal strength  SKIN: No rashes or lesions         Labs and Results:  Labs, Radiology, Cardiology, and Other Results: 14.7   9.39  )-----------( 267      ( 09 Sep 2021 09:45 )             43.9       09-09    139  |  105  |  18  ----------------------------<  84  4.2   |  22  |  0.8    Ca    9.7      09 Sep 2021 09:45    TPro  6.7  /  Alb  4.7  /  TBili  0.4  /  DBili  x   /  AST  18  /  ALT  32  /  AlkPhos  69  09-09        < from: Xray Lumbosacral Spine (09.09.21 @ 12:46) >        < end of copied text >    < from: [facility] Duplex Lower Ext Vein Scan, Bilat (09.08.21 @ 10:32) >    Impression:consult for lt ankle pain ,xray with old chip fx distal fibula 2mm,  no swelling ,no erythema ,neg anterior draw, good ROM, good dorsal and planter flexion       recom: wt bear as rodrigue ,pain in leg is from lumbar referred down LE, f/u 4 weeks with foot and ankle specialist              < end of copied text >

## 2021-09-12 PROCEDURE — 99232 SBSQ HOSP IP/OBS MODERATE 35: CPT

## 2021-09-12 RX ADMIN — Medication 40 MILLIGRAM(S): at 14:13

## 2021-09-12 RX ADMIN — ENOXAPARIN SODIUM 40 MILLIGRAM(S): 100 INJECTION SUBCUTANEOUS at 12:14

## 2021-09-12 RX ADMIN — TRAMADOL HYDROCHLORIDE 50 MILLIGRAM(S): 50 TABLET ORAL at 16:45

## 2021-09-12 RX ADMIN — Medication 650 MILLIGRAM(S): at 10:09

## 2021-09-12 NOTE — PROGRESS NOTE ADULT - ASSESSMENT
A 45 y/o male with pmhx of lower back pain/sciatica , herniated disc L4-L5  presents with lower back and left leg pain since Monday.    #lower back pain /sciatica   xray lumbar -No evidence of compression deformity or subluxation  Disc space narrowing at L4-L5 and L5-S1.  -pain meds  -prednisone   -MRI lumbar noticed.  prednisone taper as per neurosurgery  -NSAID  -x-ray of the ankle, and tib-fib left shows Chronic nondisplaced fracture deformity of the distal fibular tip. Degenerative changes of the hindfoot and midfoot joints. ortho consult   -PT consult 
A 47 y/o male with pmhx of lower back pain/sciatica , herniated disc L4-L5  presents with lower back and left leg pain since Monday.    #lower back pain /sciatica   xray lumbar -No evidence of compression deformity or subluxation  Disc space narrowing at L4-L5 and L5-S1.  -pain meds  -prednisone   -MRI lumbar noticed.  prednisone taper as per neurosurgery  -Offered surgery to patient but refused with full understanding of risks, benefits, and alternatives which were discussed in details using simple words with mother, and patient.  risks, includes but not limited to worsen pain, symptoms, foot drop, lost sensation, paralysis, disability, and death.  seems to understand those risks, and prefers to follow up with neurosurgery as outpatient  -NSAID  -x-ray of the ankle, and tib-fib left shows Chronic nondisplaced fracture deformity of the distal fibular tip. Degenerative changes of the hindfoot and midfoot joints. ortho recommended outpatient follow up    -PT consult     refuses rehab, all risks, benefits, and alternatives discussed  will be anticipated for discharge tomorrow
A 47 y/o male with pmhx of lower back pain/sciatica , herniated disc L4-L5  presents with lower back and left leg pain since Monday.    #lower back pain /sciatica   xray lumbar -No evidence of compression deformity or subluxation  Disc space narrowing at L4-L5 and L5-S1.  -pain meds  -prednisone   -MRI lumbar non contrast  -Pt consult  -NSAID  -x-ray of the ankle, and tib-fib left

## 2021-09-12 NOTE — PROGRESS NOTE ADULT - SUBJECTIVE AND OBJECTIVE BOX
CC.  Lower back pain  HPI.  Patient reports back pain improved on the steriod  feels a bit better  complaining of tib/fib left pain     offers no other complaints  denies any numbness, weakness, change in BM or urinary pattern           Constitutional: No fever, fatigue or weight loss.  Skin: No rash.  Eyes: No recent vision problems or eye pain.  ENT: No congestion, ear pain, or sore throat.  Endocrine: No thyroid problems.  Cardiovascular: No chest pain or palpation.  Respiratory: No cough, shortness of breath, congestion, or wheezing.  Gastrointestinal: No abdominal pain, nausea, vomiting, or diarrhea.  Genitourinary: No dysuria.  Musculoskeletal: No joint swelling.  Neurologic: No headache.      Vital Signs Last 24 Hrs  T(C): 36.1 (09-10-21 @ 05:00), Max: 36.1 (09-09-21 @ 21:06)  T(F): 96.9 (09-10-21 @ 05:00), Max: 96.9 (09-09-21 @ 21:06)  HR: 61 (09-10-21 @ 05:00) (61 - 73)  BP: 118/75 (09-10-21 @ 05:00) (118/75 - 138/80)  BP(mean): --  RR: 16 (09-10-21 @ 13:36) (16 - 16)  SpO2: 97% (09-10-21 @ 13:36) (97% - 100%)        PHYSICAL EXAM-  GENERAL: NAD,    HEAD:  Atraumatic, Normocephalic  EYES: EOMI, PERRLA, conjunctiva and sclera clear  NECK: Supple, No JVD, Normal thyroid  NERVOUS SYSTEM:  Alert & Oriented X3, Motor Strength 5/5 B/L upper and lower extremities; DTRs 2+ intact and symmetric  CHEST/LUNG: Clear to percussion bilaterally; No rales, rhonchi, wheezing, or rubs  HEART: Regular rate and rhythm; No murmurs, rubs, or gallops  ABDOMEN: Soft, Nontender, Nondistended; Bowel sounds present  EXTREMITIES: No clubbing, cyanosis, or edema  SKIN: No rashes or lesions                                  14.1   9.85  )-----------( 262      ( 10 Sep 2021 07:05 )             43.3     09-10    141  |  103  |  12  ----------------------------<  78  4.0   |  26  |  0.8    Ca    9.7      10 Sep 2021 07:05    TPro  6.7  /  Alb  4.7  /  TBili  0.4  /  DBili  x   /  AST  18  /  ALT  32  /  AlkPhos  69  09-09                    MEDICATIONS  (STANDING):  chlorhexidine 4% Liquid 1 Application(s) Topical <User Schedule>  enoxaparin Injectable 40 milliGRAM(s) SubCutaneous daily  predniSONE   Tablet 50 milliGRAM(s) Oral once    MEDICATIONS  (PRN):  acetaminophen   Tablet .. 650 milliGRAM(s) Oral every 6 hours PRN Temp greater or equal to 38C (100.4F), Mild Pain (1 - 3)  traMADol 50 milliGRAM(s) Oral every 8 hours PRN Moderate Pain (4 - 6)          RADIOLOGY RESULTS:  
CC.  Lower back pain  HPI.  Patient reports back pain improved on the steriod,    feels a bit better  ambulating better with PT     offers no other complaints  denies any numbness, weakness, change in BM or urinary pattern           Constitutional: No fever, fatigue or weight loss.  Skin: No rash.  Eyes: No recent vision problems or eye pain.  ENT: No congestion, ear pain, or sore throat.  Endocrine: No thyroid problems.  Cardiovascular: No chest pain or palpation.  Respiratory: No cough, shortness of breath, congestion, or wheezing.  Gastrointestinal: No abdominal pain, nausea, vomiting, or diarrhea.  Genitourinary: No dysuria.  Musculoskeletal: No joint swelling.  Neurologic: No headache.    Vital Signs Last 24 Hrs  T(C): 36.5 (12 Sep 2021 05:00), Max: 36.5 (12 Sep 2021 05:00)  T(F): 97.7 (12 Sep 2021 05:00), Max: 97.7 (12 Sep 2021 05:00)  HR: 64 (12 Sep 2021 05:00) (64 - 74)  BP: 115/69 (12 Sep 2021 05:00) (115/69 - 133/70)  BP(mean): --  RR: 18 (12 Sep 2021 05:00) (18 - 18)  SpO2: --        PHYSICAL EXAM-  GENERAL: NAD,    HEAD:  Atraumatic, Normocephalic  EYES: EOMI, PERRLA, conjunctiva and sclera clear  NECK: Supple, No JVD, Normal thyroid  NERVOUS SYSTEM:  Alert & Oriented X3, Motor Strength 5/5 B/L upper and lower extremities; DTRs 2+ intact and symmetric  CHEST/LUNG: Clear to percussion bilaterally; No rales, rhonchi, wheezing, or rubs  HEART: Regular rate and rhythm; No murmurs, rubs, or gallops  ABDOMEN: Soft, Nontender, Nondistended; Bowel sounds present  EXTREMITIES: No clubbing, cyanosis, or edema  SKIN: No rashes or lesions                         MEDICATIONS  (STANDING):  chlorhexidine 4% Liquid 1 Application(s) Topical <User Schedule>  enoxaparin Injectable 40 milliGRAM(s) SubCutaneous daily  predniSONE   Tablet 50 milliGRAM(s) Oral once    MEDICATIONS  (PRN):  acetaminophen   Tablet .. 650 milliGRAM(s) Oral every 6 hours PRN Temp greater or equal to 38C (100.4F), Mild Pain (1 - 3)  traMADol 50 milliGRAM(s) Oral every 8 hours PRN Moderate Pain (4 - 6)          RADIOLOGY RESULTS:  
CC.  Lower back pain  HPI.  Patient reports back pain improved on the steriod, although refused steriod yesterday, after long discussion not willing to try it   feels a bit better  complaining of tib/fib left pain x-ray noticed    offers no other complaints  denies any numbness, weakness, change in BM or urinary pattern           Constitutional: No fever, fatigue or weight loss.  Skin: No rash.  Eyes: No recent vision problems or eye pain.  ENT: No congestion, ear pain, or sore throat.  Endocrine: No thyroid problems.  Cardiovascular: No chest pain or palpation.  Respiratory: No cough, shortness of breath, congestion, or wheezing.  Gastrointestinal: No abdominal pain, nausea, vomiting, or diarrhea.  Genitourinary: No dysuria.  Musculoskeletal: No joint swelling.  Neurologic: No headache.      Vital Signs Last 24 Hrs  T(C): 35.8 (11 Sep 2021 04:53), Max: 36.2 (10 Sep 2021 21:06)  T(F): 96.5 (11 Sep 2021 04:53), Max: 97.1 (10 Sep 2021 21:06)  HR: 62 (11 Sep 2021 04:53) (62 - 70)  BP: 119/68 (11 Sep 2021 04:53) (97/69 - 119/68)  BP(mean): --  RR: 18 (11 Sep 2021 04:53) (16 - 18)  SpO2: 97% (10 Sep 2021 13:36) (97% - 97%)        PHYSICAL EXAM-  GENERAL: NAD,    HEAD:  Atraumatic, Normocephalic  EYES: EOMI, PERRLA, conjunctiva and sclera clear  NECK: Supple, No JVD, Normal thyroid  NERVOUS SYSTEM:  Alert & Oriented X3, Motor Strength 5/5 B/L upper and lower extremities; DTRs 2+ intact and symmetric  CHEST/LUNG: Clear to percussion bilaterally; No rales, rhonchi, wheezing, or rubs  HEART: Regular rate and rhythm; No murmurs, rubs, or gallops  ABDOMEN: Soft, Nontender, Nondistended; Bowel sounds present  EXTREMITIES: No clubbing, cyanosis, or edema  SKIN: No rashes or lesions                         MEDICATIONS  (STANDING):  chlorhexidine 4% Liquid 1 Application(s) Topical <User Schedule>  enoxaparin Injectable 40 milliGRAM(s) SubCutaneous daily  predniSONE   Tablet 50 milliGRAM(s) Oral once    MEDICATIONS  (PRN):  acetaminophen   Tablet .. 650 milliGRAM(s) Oral every 6 hours PRN Temp greater or equal to 38C (100.4F), Mild Pain (1 - 3)  traMADol 50 milliGRAM(s) Oral every 8 hours PRN Moderate Pain (4 - 6)          RADIOLOGY RESULTS:

## 2021-09-13 ENCOUNTER — TRANSCRIPTION ENCOUNTER (OUTPATIENT)
Age: 46
End: 2021-09-13

## 2021-09-13 VITALS
TEMPERATURE: 97 F | SYSTOLIC BLOOD PRESSURE: 115 MMHG | HEART RATE: 70 BPM | RESPIRATION RATE: 16 BRPM | DIASTOLIC BLOOD PRESSURE: 59 MMHG

## 2021-09-13 PROCEDURE — 99239 HOSP IP/OBS DSCHRG MGMT >30: CPT

## 2021-09-13 RX ORDER — TRAMADOL HYDROCHLORIDE 50 MG/1
1 TABLET ORAL
Qty: 30 | Refills: 0
Start: 2021-09-13 | End: 2021-09-22

## 2021-09-13 RX ADMIN — TRAMADOL HYDROCHLORIDE 50 MILLIGRAM(S): 50 TABLET ORAL at 03:01

## 2021-09-13 RX ADMIN — TRAMADOL HYDROCHLORIDE 50 MILLIGRAM(S): 50 TABLET ORAL at 11:20

## 2021-09-13 RX ADMIN — Medication 40 MILLIGRAM(S): at 13:19

## 2021-09-13 RX ADMIN — TRAMADOL HYDROCHLORIDE 50 MILLIGRAM(S): 50 TABLET ORAL at 04:30

## 2021-09-13 RX ADMIN — ENOXAPARIN SODIUM 40 MILLIGRAM(S): 100 INJECTION SUBCUTANEOUS at 11:24

## 2021-09-13 NOTE — DISCHARGE NOTE PROVIDER - CARE PROVIDER_API CALL
Molly Amaya)  [facility]  [address] [place] [address]  Phone: [phone]  Fax: [phone]  Follow Up Time: 1-3 days   Molly Amaya)  [facility]  [address] [place] [address]  Phone: [phone]  Fax: [phone]  Follow Up Time: 1-3 days    Antoine Jefferson  ORTHOPAEDIC SURGERY  [address] [place] [address]  Phone: [phone]  Fax: [phone]  Follow Up Time: 1 week

## 2021-09-13 NOTE — DISCHARGE NOTE PROVIDER - CARE PROVIDERS DIRECT ADDRESSES
,DirectAddress_Unknown ,DirectAddress_Unknown,[email or web address]

## 2021-09-13 NOTE — DISCHARGE NOTE PROVIDER - NSDCFUADDAPPT_GEN_ALL_CORE_FT
Please follow up with neurosurgery in 3-4 days  Please follow up with your doctor as scheduled  Please come back to the hospital if you develop any new symptoms or concerns Please follow up with neurosurgery in 3-4 days  Please follow up with your doctor as scheduled  Please come back to the hospital if you develop any new symptoms or concerns  please follow up with ortho in one week

## 2021-09-13 NOTE — DISCHARGE NOTE NURSING/CASE MANAGEMENT/SOCIAL WORK - NSDCFUADDAPPT_GEN_ALL_CORE_FT
Please follow up with neurosurgery in 3-4 days  Please follow up with your doctor as scheduled  Please come back to the hospital if you develop any new symptoms or concerns  please follow up with ortho in one week

## 2021-09-13 NOTE — DISCHARGE NOTE NURSING/CASE MANAGEMENT/SOCIAL WORK - PATIENT PORTAL LINK FT
You can access the FollowMyHealth Patient Portal offered by [facility] by registering at the following website: [email or web address]. By joining [facility]’s FollowMyHealth portal, you will also be able to view your health information using other applications (apps) compatible with our system.

## 2021-09-13 NOTE — DISCHARGE NOTE PROVIDER - NSDCMRMEDTOKEN_GEN_ALL_CORE_FT
naproxen 500 mg oral tablet: 1 tab(s) orally 2 times a day   predniSONE 10 mg oral tablet: 4 tab(s) orally once a day x 2 days  decrease by one every 2 days then stop  traMADol 50 mg oral tablet: 1 tab(s) orally every 8 hours, As needed, Moderate Pain (4 - 6) MDD:3

## 2021-09-13 NOTE — DISCHARGE NOTE PROVIDER - PROVIDER TOKENS
PROVIDER:[TOKEN:[[id number]],FOLLOWUP:[1-3 days]] PROVIDER:[TOKEN:[[id number]],FOLLOWUP:[1-3 days]],PROVIDER:[TOKEN:[[id number]],FOLLOWUP:[1 week]]

## 2021-09-13 NOTE — DISCHARGE NOTE PROVIDER - HOSPITAL COURSE
Patient is 47 y/o male with pmhx of lower back pain/sciatica , herniated disc L4-L5  presents with lower back and left leg pain since Monday.    #lower back pain /sciatica   xray lumbar -No evidence of compression deformity or subluxation  Disc space narrowing at L4-L5 and L5-S1.  -pain meds  -prednisone   -MRI lumbar noticed.  prednisone taper as per neurosurgery  -Offered surgery to patient but refused with full understanding of risks, benefits, and alternatives which were discussed in details using simple words with mother, and patient.  risks, includes but not limited to worsen pain, symptoms, foot drop, lost sensation, paralysis, disability, and death.  seems to understand those risks, and prefers to follow up with neurosurgery outpatient follow up   -NSAID refused  -x-ray of the ankle, and tib-fib left shows Chronic nondisplaced fracture deformity of the distal fibular tip. Degenerative changes of the hindfoot and midfoot joints. ortho recommended outpatient follow up    -PT consult   -neurosurgery also recommended outpatient spinal steriod injection  refuses rehab, and home rehab, all risks, benefits, and alternatives discussed    Patient was seen and examined today   feels better.  pain is improving.  ambulating better  denies any numbness, weakness, change in BM or urinary pattern  Constitutional: No fever, fatigue or weight loss.  Skin: No rash.  Eyes: No recent vision problems or eye pain.  ENT: No congestion, ear pain, or sore throat.  Endocrine: No thyroid problems.  Cardiovascular: No chest pain or palpation.  Respiratory: No cough, shortness of breath, congestion, or wheezing.  Gastrointestinal: No abdominal pain, nausea, vomiting, or diarrhea.  Genitourinary: No dysuria.  Musculoskeletal: No joint swelling.  Neurologic: No headache.  Vital Signs Last 24 Hrs  T(C): 36.3 (09-13-21 @ 05:00), Max: 36.3 (09-13-21 @ 05:00)  T(F): 97.3 (09-13-21 @ 05:00), Max: 97.3 (09-13-21 @ 05:00)  HR: 62 (09-13-21 @ 05:00) (62 - 64)  BP: 110/73 (09-13-21 @ 05:00) (110/73 - 115/62)  BP(mean): --  RR: 16 (09-13-21 @ 05:00) (16 - 16)  SpO2: --  PHYSICAL EXAM-  GENERAL: NAD,   HEAD:  Atraumatic, Normocephalic  EYES: EOMI, PERRLA, conjunctiva and sclera clear  NECK: Supple, No JVD, Normal thyroid  NERVOUS SYSTEM:  Alert & Oriented X3, Moving all extremities  CHEST/LUNG: Clear to percussion bilaterally; No rales, rhonchi, wheezing, or rubs  HEART: Regular rate and rhythm; No murmurs, rubs, or gallops  ABDOMEN: Soft, Nontender, Nondistended; Bowel sounds present  EXTREMITIES:   No clubbing, cyanosis, or edema  SKIN: No rashes or lesions  Hospital course, and discharge planning were discussed with patient, and mother in details and on a daily basis.  all questions were answered.  seems to understand, and in agreement.  time 70 min                                      MEDICATIONS  (STANDING):  chlorhexidine 4% Liquid 1 Application(s) Topical <User Schedule>  enoxaparin Injectable 40 milliGRAM(s) SubCutaneous daily    MEDICATIONS  (PRN):  acetaminophen   Tablet .. 650 milliGRAM(s) Oral every 6 hours PRN Temp greater or equal to 38C (100.4F), Mild Pain (1 - 3)  traMADol 50 milliGRAM(s) Oral every 8 hours PRN Moderate Pain (4 - 6)          RADIOLOGY RESULTS:

## 2021-09-13 NOTE — DISCHARGE NOTE PROVIDER - NSDCCPCAREPLAN_GEN_ALL_CORE_FT
PRINCIPAL DISCHARGE DIAGNOSIS  Diagnosis: Low back pain radiating down leg  Assessment and Plan of Treatment: continue with current medications  outpatient follow up with neurosurgery in 2-3 days  continue with physicial therapy  follow up with your doctor as scheduled next week

## 2021-09-15 DIAGNOSIS — M54.5 LOW BACK PAIN: ICD-10-CM

## 2021-09-15 DIAGNOSIS — M51.16 INTERVERTEBRAL DISC DISORDERS WITH RADICULOPATHY, LUMBAR REGION: ICD-10-CM

## 2021-09-15 DIAGNOSIS — M84.664A: ICD-10-CM

## 2022-08-29 ENCOUNTER — EMERGENCY (EMERGENCY)
Facility: HOSPITAL | Age: 47
LOS: 0 days | Discharge: HOME | End: 2022-08-29
Attending: EMERGENCY MEDICINE | Admitting: EMERGENCY MEDICINE

## 2022-08-29 VITALS
SYSTOLIC BLOOD PRESSURE: 115 MMHG | HEIGHT: 69 IN | RESPIRATION RATE: 18 BRPM | WEIGHT: 229.94 LBS | DIASTOLIC BLOOD PRESSURE: 67 MMHG | HEART RATE: 82 BPM | TEMPERATURE: 98 F | OXYGEN SATURATION: 98 %

## 2022-08-29 VITALS — WEIGHT: 229.94 LBS | HEIGHT: 74 IN

## 2022-08-29 DIAGNOSIS — M54.50 LOW BACK PAIN, UNSPECIFIED: ICD-10-CM

## 2022-08-29 DIAGNOSIS — R51.9 HEADACHE, UNSPECIFIED: ICD-10-CM

## 2022-08-29 DIAGNOSIS — R09.81 NASAL CONGESTION: ICD-10-CM

## 2022-08-29 DIAGNOSIS — R07.0 PAIN IN THROAT: ICD-10-CM

## 2022-08-29 DIAGNOSIS — F41.9 ANXIETY DISORDER, UNSPECIFIED: ICD-10-CM

## 2022-08-29 LAB — SARS-COV-2 RNA SPEC QL NAA+PROBE: SIGNIFICANT CHANGE UP

## 2022-08-29 PROCEDURE — 99283 EMERGENCY DEPT VISIT LOW MDM: CPT

## 2022-08-29 RX ORDER — IBUPROFEN 200 MG
600 TABLET ORAL ONCE
Refills: 0 | Status: COMPLETED | OUTPATIENT
Start: 2022-08-29 | End: 2022-08-29

## 2022-08-29 RX ADMIN — Medication 1 TABLET(S): at 21:19

## 2022-08-29 RX ADMIN — Medication 600 MILLIGRAM(S): at 20:45

## 2022-08-29 NOTE — ED PROVIDER NOTE - CHILD ABUSE FACILITY
Patient swabbed for COVID-19 using BD MAX swab. Patient specimen collected due to order present by Urgent [facility] provider.
[facility]

## 2022-08-29 NOTE — ED PROVIDER NOTE - CLINICAL SUMMARY MEDICAL DECISION MAKING FREE TEXT BOX
47 male presents emergency department complaining of sinus pressure and abnormal nasal smells.  No acute emergent findings on medical screening exam, will test for coronavirus and discharge with outpatient management of sinus congestion.  Recommended to him for PMD visit given abnormal smells if they are persistent.

## 2022-08-29 NOTE — ED PROVIDER NOTE - CARE PROVIDERS DIRECT ADDRESSES
[email or web address]

## 2022-08-29 NOTE — ED PROVIDER NOTE - PHYSICAL EXAMINATION
Physical Exam    Vital Signs: I have reviewed the initial vital signs.  Constitutional: well-nourished, appears stated age, no acute distress  Eyes: Conjunctiva pink, Sclera clear, PERRLA, EOMI.  Ear: TMs normal with cermuman b/l  Nose: no abscess seen + redness to turbinates with disharge noted. + right sided sinus tenderness maxillary and frontal  Cardiovascular: S1 and S2, regular rate, regular rhythm, well-perfused extremities, radial pulses equal and 2+  Respiratory: unlabored respiratory effort, clear to auscultation bilaterally no wheezing, rales and rhonchi  Gastrointestinal: soft, non-tender abdomen, no pulsatile mass, normal bowl sounds  Musculoskeletal: supple neck, no lower extremity edema, no midline tenderness  Integumentary: warm, dry, no rash  Neurologic: awake, alert, cranial nerves II-XII grossly intact, extremities’ motor and sensory functions grossly intact  Psychiatric: appropriate mood, appropriate affect

## 2022-08-29 NOTE — ED PROVIDER NOTE - NSFOLLOWUPINSTRUCTIONS_ED_ALL_ED_FT
Follow up with your primary care doctor in 1-2 days    Sinusitis    WHAT YOU NEED TO KNOW:    Sinusitis is inflammation or infection of your sinuses. It is most often caused by a virus. Acute sinusitis may last up to 12 weeks. Chronic sinusitis lasts longer than 12 weeks. Recurrent sinusitis means you have 4 or more times in 1 year. Sinuses         DISCHARGE INSTRUCTIONS:    Return to the emergency department if:     Your eye and eyelid are red, swollen, and painful.       You cannot open your eye.       You have vision changes, such as double vision.      Your eyeball bulges out or you cannot move your eye.       You are more sleepy than normal, or you notice changes in your ability to think, move, or talk.      You have a stiff neck, a fever, or a bad headache.       You have swelling of your forehead or scalp.    Contact your healthcare provider if:     Your symptoms do not improve after 3 days.      Your symptoms do not go away after 10 days.      You have nausea and are vomiting.      Your nose is bleeding.      You have questions or concerns about your condition or care.    Medicines: Your symptoms may go away on their own. Your healthcare provider may recommend watchful waiting for up to 10 days before starting antibiotics. You may need any of the following:     Acetaminophen decreases pain and fever. It is available without a doctor's order. Ask how much to take and how often to take it. Follow directions. Read the labels of all other medicines you are using to see if they also contain acetaminophen, or ask your doctor or pharmacist. Acetaminophen can cause liver damage if not taken correctly. Do not use more than 4 grams (4,000 milligrams) total of acetaminophen in one day.       NSAIDs, such as ibuprofen, help decrease swelling, pain, and fever. This medicine is available with or without a doctor's order. NSAIDs can cause stomach bleeding or kidney problems in certain people. If you take blood thinner medicine, always ask your healthcare provider if NSAIDs are safe for you. Always read the medicine label and follow directions.      Nasal steroid sprays may help decrease inflammation in your nose and sinuses.      Decongestants help reduce swelling and drain mucus in the nose and sinuses. They may help you breathe easier.       Antihistamines help dry mucus in the nose and relieve sneezing.       Antibiotics help treat or prevent a bacterial infection.      Take your medicine as directed. Contact your healthcare provider if you think your medicine is not helping or if you have side effects. Tell him or her if you are allergic to any medicine. Keep a list of the medicines, vitamins, and herbs you take. Include the amounts, and when and why you take them. Bring the list or the pill bottles to follow-up visits. Carry your medicine list with you in case of an emergency.    Self-care:     Rinse your sinuses. Use a sinus rinse device to rinse your nasal passages with a saline (salt water) solution or distilled water. Do not use tap water. This will help thin the mucus in your nose and rinse away pollen and dirt. It will also help reduce swelling so you can breathe normally. Ask your healthcare provider how often to do this.       Breathe in steam. Heat a bowl of water until you see steam. Lean over the bowl and make a tent over your head with a large towel. Breathe deeply for about 20 minutes. Be careful not to get too close to the steam or burn yourself. Do this 3 times a day. You can also breathe deeply when you take a hot shower.       Sleep with your head elevated. Place an extra pillow under your head before you go to sleep to help your sinuses drain.       Drink liquids as directed. Ask your healthcare provider how much liquid to drink each day and which liquids are best for you. Liquids will thin the mucus in your nose and help it drain. Avoid drinks that contain alcohol or caffeine.       Do not smoke, and avoid secondhand smoke. Nicotine and other chemicals in cigarettes and cigars can make your symptoms worse. Ask your healthcare provider for information if you currently smoke and need help to quit. E-cigarettes or smokeless tobacco still contain nicotine. Talk to your healthcare provider before you use these products.     Prevent the spread of germs that cause sinusitis: Wash your hands often with soap and water. Wash your hands after you use the bathroom, change a child's diaper, or sneeze. Wash your hands before you prepare or eat food. Handwashing         Follow up with your healthcare provider as directed: You may be referred to an ear, nose, and throat specialist. Write down your questions so you remember to ask them during your visits.        © Copyright [employer] 2019 All illustrations and images included in CareNotes are the copyrighted property of [employer] or [employer].

## 2022-08-29 NOTE — ED PROVIDER NOTE - NS ED ATTENDING STATEMENT MOD
This was a shared visit with the JASSI. I reviewed and verified the documentation and independently performed the documented:

## 2022-08-29 NOTE — ED PROVIDER NOTE - CARE PROVIDER_API CALL
Lito Wu)  Otolaryngology  [address] [place] [address], [address] [place] [address]  Phone: [phone]  Fax: [phone]  Follow Up Time:

## 2022-08-29 NOTE — ED ADULT NURSE NOTE - CHPI ED NUR SYMPTOMS POS
pt states he has a headache, nasal pain and the sensation of the smell of  gas that began at 12:00pm today

## 2022-08-29 NOTE — ED PROVIDER NOTE - ATTENDING APP SHARED VISIT CONTRIBUTION OF CARE
I personally evaluated the patient. I reviewed the Resident´s or Physician Assistant´s note (as assigned above), and agree with the findings and plan except as documented in my note.      47-year-old male presents emergency department complaining of abnormal smells and facial pain for several days.  No other constitutional symptoms.  Denies injury trauma falls.    The review of systems otherwise unremarkable    GENERAL: male in no distress.   HEENT: EOMI non icteric no facial swelling no photophobia  NECK: FROM  CHEST: normal work of breathing noted.  CV: pulses intact  ABD: soft, non rigid, non distended  EXTR: FROM   NEURO: AAO 3 no focal deficits gait memory cognition coordination grossly intact  SKIN: normal no pallor  PSYCH: Mildly anxious mood & mentation    Impression: Sinus congestion    Plan: Labs, supportive care, outpatient management

## 2022-08-29 NOTE — ED PROVIDER NOTE - OBJECTIVE STATEMENT
47 year old male no sig past medical history comes to emergency room for right sided facial, ear and throat pain for the last day with associated "foul ordor" that he is smelling. patient denies fever/chills. no headache, no dizziness no weakness, no coordination issue.

## 2022-08-29 NOTE — ED PROVIDER NOTE - PATIENT PORTAL LINK FT
You can access the FollowMyHealth Patient Portal offered by [facility] by registering at the following website: [email or web address]. By joining [employer]’s FollowMyHealth portal, you will also be able to view your health information using other applications (apps) compatible with our system.

## 2022-08-30 PROBLEM — M54.5 LOW BACK PAIN: Chronic | Status: ACTIVE | Noted: 2021-09-09

## 2022-10-11 NOTE — ED ADULT TRIAGE NOTE - INTERNATIONAL TRAVEL
----- Message from Yeni Raji sent at 10/11/2022 10:52 AM EDT -----  Subject: Refill Request    QUESTIONS  Name of Medication? levothyroxine (SYNTHROID) 100 MCG tablet  Patient-reported dosage and instructions? 100 mcg  How many days do you have left? 5  Preferred Pharmacy? [id number]  Pharmacy phone number (if available)? [phone]  ---------------------------------------------------------------------------  --------------  CALL BACK INFO  What is the best way for the office to contact you? OK to leave message on   voicemail  Preferred Call Back Phone Number? [phone]  ---------------------------------------------------------------------------  --------------  SCRIPT ANSWERS  Relationship to Patient? Other  Representative Name? Ivan Aldrich  Is the Representative on the appropriate HIPAA document in Epic?  Yes No

## 2023-02-21 PROBLEM — Z00.00 ENCOUNTER FOR PREVENTIVE HEALTH EXAMINATION: Status: ACTIVE | Noted: 2023-02-21

## 2024-02-16 ENCOUNTER — EMERGENCY (EMERGENCY)
Facility: HOSPITAL | Age: 49
LOS: 0 days | Discharge: HOME | End: 2024-02-16
Attending: EMERGENCY MEDICINE
Payer: COMMERCIAL

## 2024-02-16 VITALS
HEIGHT: 74 IN | OXYGEN SATURATION: 100 % | HEART RATE: 73 BPM | DIASTOLIC BLOOD PRESSURE: 56 MMHG | RESPIRATION RATE: 20 BRPM | WEIGHT: 220.02 LBS | TEMPERATURE: 98 F | SYSTOLIC BLOOD PRESSURE: 109 MMHG

## 2024-02-16 DIAGNOSIS — R05.1 ACUTE COUGH: ICD-10-CM

## 2024-02-16 DIAGNOSIS — J40 BRONCHITIS, NOT SPECIFIED AS ACUTE OR CHRONIC: ICD-10-CM

## 2024-02-16 DIAGNOSIS — Z20.822 CONTACT WITH AND (SUSPECTED) EXPOSURE TO COVID-19: ICD-10-CM

## 2024-02-16 LAB
FLUAV AG NPH QL: SIGNIFICANT CHANGE UP
FLUBV AG NPH QL: SIGNIFICANT CHANGE UP
RSV RNA NPH QL NAA+NON-PROBE: SIGNIFICANT CHANGE UP
SARS-COV-2 RNA SPEC QL NAA+PROBE: SIGNIFICANT CHANGE UP

## 2024-02-16 PROCEDURE — 99284 EMERGENCY DEPT VISIT MOD MDM: CPT

## 2024-02-16 PROCEDURE — 71046 X-RAY EXAM CHEST 2 VIEWS: CPT

## 2024-02-16 PROCEDURE — 0241U: CPT

## 2024-02-16 PROCEDURE — 71046 X-RAY EXAM CHEST 2 VIEWS: CPT | Mod: 26

## 2024-02-16 PROCEDURE — 99283 EMERGENCY DEPT VISIT LOW MDM: CPT | Mod: 25

## 2024-02-16 RX ORDER — LEVOFLOXACIN 5 MG/ML
1 INJECTION, SOLUTION INTRAVENOUS
Qty: 7 | Refills: 0
Start: 2024-02-16

## 2024-02-16 NOTE — ED PROVIDER NOTE - PHYSICAL EXAMINATION
CONST: NAD  EYES: Sclera and conjunctiva clear.   ENT: No nasal discharge. Oropharynx normal appearing  NECK: Non-tender, no meningeal signs. normal ROM. supple   CARD: S1 S2; No jvd  RESP: Equal BS B/L, No wheezes, rhonchi or rales. No distress  SKIN: Warm, dry, no acute rashes. Good turgor

## 2024-02-16 NOTE — ED PROVIDER NOTE - PATIENT PORTAL LINK FT
You can access the FollowMyHealth Patient Portal offered by [facility] by registering at the following website: [email or web address]. By joining [employer]’s FollowMyHealth portal, you will also be able to view your health information using other applications (apps) compatible with our system.

## 2024-02-16 NOTE — ED PROVIDER NOTE - OBJECTIVE STATEMENT
49-year-old male past medical history of sciatica presents for evaluation of cough.  Patient endorses nonproductive cough for the past couple days, no inciting relieving factors.

## 2024-02-16 NOTE — ED PROVIDER NOTE - ATTENDING APP SHARED VISIT CONTRIBUTION OF CARE
Patient complains of cough.  He denies subjective fever, myalgias, chest pain, shortness of breath.  Vital signs noted.  No apparent distress.  Occasional rhonchi noted on lung exam.  Chest x-ray reviewed.  There may be some increased interstitial markings.  Oral antibiotics ordered.  Swab sent.

## 2024-02-16 NOTE — ED PROVIDER NOTE - NSFOLLOWUPINSTRUCTIONS_ED_ALL_ED_FT
Take antibiotic as prescribed.  Take cough medicine as needed for cough.  Use Tylenol or Advil for fever.

## 2024-05-13 ENCOUNTER — APPOINTMENT (OUTPATIENT)
Dept: OTOLARYNGOLOGY | Facility: CLINIC | Age: 49
End: 2024-05-13
Payer: COMMERCIAL

## 2024-05-13 VITALS — HEIGHT: 74 IN | WEIGHT: 223 LBS | BODY MASS INDEX: 28.62 KG/M2

## 2024-05-13 DIAGNOSIS — H91.90 UNSPECIFIED HEARING LOSS, UNSPECIFIED EAR: ICD-10-CM

## 2024-05-13 DIAGNOSIS — H93.8X3 OTHER SPECIFIED DISORDERS OF EAR, BILATERAL: ICD-10-CM

## 2024-05-13 DIAGNOSIS — H61.23 IMPACTED CERUMEN, BILATERAL: ICD-10-CM

## 2024-05-13 PROCEDURE — 92550 TYMPANOMETRY & REFLEX THRESH: CPT | Mod: 52

## 2024-05-13 PROCEDURE — G0268 REMOVAL OF IMPACTED WAX MD: CPT

## 2024-05-13 PROCEDURE — 99202 OFFICE O/P NEW SF 15 MIN: CPT | Mod: 25

## 2024-05-13 PROCEDURE — 92557 COMPREHENSIVE HEARING TEST: CPT

## 2024-05-13 NOTE — PHYSICAL EXAM
[Normal] : mucosa is normal [Midline] : trachea located in midline position [de-identified] : cerumen impaction

## 2024-06-02 ENCOUNTER — EMERGENCY (EMERGENCY)
Facility: HOSPITAL | Age: 49
LOS: 0 days | Discharge: HOME | End: 2024-06-02
Attending: EMERGENCY MEDICINE
Payer: COMMERCIAL

## 2024-06-02 VITALS
WEIGHT: 220.9 LBS | DIASTOLIC BLOOD PRESSURE: 61 MMHG | OXYGEN SATURATION: 99 % | HEIGHT: 74 IN | TEMPERATURE: 99 F | SYSTOLIC BLOOD PRESSURE: 100 MMHG | HEART RATE: 84 BPM | RESPIRATION RATE: 20 BRPM

## 2024-06-02 DIAGNOSIS — Z20.822 CONTACT WITH AND (SUSPECTED) EXPOSURE TO COVID-19: ICD-10-CM

## 2024-06-02 DIAGNOSIS — J00 ACUTE NASOPHARYNGITIS [COMMON COLD]: ICD-10-CM

## 2024-06-02 DIAGNOSIS — R50.9 FEVER, UNSPECIFIED: ICD-10-CM

## 2024-06-02 PROCEDURE — 0241U: CPT

## 2024-06-02 PROCEDURE — 99283 EMERGENCY DEPT VISIT LOW MDM: CPT

## 2024-06-02 NOTE — ED PROVIDER NOTE - CHIEF COMPLAINT
Labor Progress Note    Lias Arevalo   32w3d      Subjective:  Pt was woken up last night and was given a dose of terbutaline since the nurse saw contractions on the monitor. Pt did not feel contractions last night. This morning pt has been feeling lower pelvic cramps but not contractions like when she was admitted. Pt  without vaginal bleeding. Pt with good fetal movement.       Objective:   Vitals:    07/12/18 2342 07/13/18 0339 07/13/18 0631 07/13/18 0721   BP: 101/55 101/54 103/62 108/62   Pulse: 65 75 77 67   Resp:  16     Temp:  36.8 °C (98.3 °F)  36.7 °C (98.1 °F)   TempSrc:    Temporal   SpO2:       Weight:       Height:         FHT: 120's category 1 no decels  [place]: irritability  SVE: 2/75/0, no change, same examiner.  Membranes ruptured: .no    Meds:   Nifedipine 10 mg q 6 hrs, pnv, colace, synthroid.  Labs:  No results found for this or any previous visit (from the past 24 hour(s)).    Assessment:   32w3d/PTL- pt occ feels contractions and in the last 24 hours has had 2 doses of terbutaline 0.25mg im. Pt tolerating nifedipine 10 mg q 6 hrs. Pt without cervical change. Will increase nifedipine to 10 mg q4 hrs and monitor today. If pt remains stable will d/c home this afternoon and will f/u with me next week.  Fetal status- reassuring.         Jennifer Pinto           The patient is a 49y Male complaining of fever.

## 2024-06-02 NOTE — ED PROVIDER NOTE - NSFOLLOWUPINSTRUCTIONS_ED_ALL_ED_FT
Fever    Continue taking Tylenol at home for fever. Please monitor below symptoms    A fever is an increase in the body's temperature. It is usually defined as a temperature of 100°F (38°C) or higher. If your child is older than three months, a brief mild or moderate fever generally has no long-term effect, and it usually does not require treatment. Take medications as directed by your health care provider.    SEEK IMMEDIATE MEDICAL CARE IF YOU D DEVELOPS THE FOLLOWING SYMPTOMS: shortness of breath, seizure, rash/stiff neck/headache, severe abdominal pain, persistent vomiting, any signs of dehydration,

## 2024-06-02 NOTE — ED PROVIDER NOTE - PATIENT PORTAL LINK FT
You can access the FollowMyHealth Patient Portal offered by [facility] by registering at the following website: [email or web address]. By joining [employer]’s FollowMyHealth portal, you will also be able to view your health information using other applications (apps) compatible with our system.

## 2024-06-02 NOTE — ED PROVIDER NOTE - OBJECTIVE STATEMENT
49 years old male no significant history presents complaints of fever.  Started around 6 PM.  Reported he was feeling cold to his head and his finger so he check his temperature.  Temperature at home was fluctuated between 102-104.  Took as a 975 mg Tylenol prior to ED arrival and feels his temperature came down already.  Otherwise denies headache, dizziness, chest pain, sore throat, coughing, ear pain, abdominal pain, vomiting, diarrhea, change in appetite, and urinary symptoms.

## 2024-06-02 NOTE — ED PROVIDER NOTE - PHYSICAL EXAMINATION
CONSTITUTIONAL: Well-appearing; well-nourished; in no apparent distress.   EYES: PERRL; EOM intact.   ENT: no rhinorrhea; normal pharynx with no tonsillar hypertrophy.   NECK: no cervical lymphadenopathy.    CARDIOVASCULAR: Normal S1, S2; no murmurs, rubs, or gallops.   RESPIRATORY: Normal chest excursion with respiration; breath sounds clear and equal bilaterally; no wheezes, rhonchi, or rales.  GI: Normal bowel sounds; non-distended; non-tender; no palpable organomegaly.   SKIN: Normal for age and race; warm; dry; good turgor; no apparent lesions or exudate.   NEURO/PSYCH: A & O x 4; grossly unremarkable.

## 2024-06-02 NOTE — ED PROVIDER NOTE - CLINICAL SUMMARY MEDICAL DECISION MAKING FREE TEXT BOX
49-year-old male, history of sciatica, presents with fever and cold hands for few hours.  Exam unremarkable.  Likely viral illness.  Will DC and refer to PCP. 49-year-old male, history of sciatica, presents with fever and cold hands for few hours.  Exam unremarkable.  Nasal swab sent.  Likely viral illness.  Will DC and refer to PCP.

## 2024-06-02 NOTE — ED PROVIDER NOTE - ATTENDING APP SHARED VISIT CONTRIBUTION OF CARE
49-year-old male, history of sciatica, presents with fever and cold hands for a few hours.  No cough, runny nose or bodyaches.  Exam shows alert patient in no distress, HEENT NCAT PERRL, neck supple, throat no exudates, lungs clear, RR S1S2, abdomen soft NT +BS, no CCE, skin no rash, neuro A&OX3 GCS 15 no deficits.

## 2024-06-02 NOTE — ED ADULT NURSE NOTE - BIRTH SEX
Male Home Suture Removal Text: Patient was provided instructions on removing sutures and will remove their sutures at home.  If they have any questions or difficulties they will call the office.

## 2024-06-03 ENCOUNTER — EMERGENCY (EMERGENCY)
Facility: HOSPITAL | Age: 49
LOS: 0 days | Discharge: HOME | End: 2024-06-03
Attending: EMERGENCY MEDICINE
Payer: COMMERCIAL

## 2024-06-03 VITALS
RESPIRATION RATE: 18 BRPM | TEMPERATURE: 100 F | HEART RATE: 89 BPM | SYSTOLIC BLOOD PRESSURE: 112 MMHG | DIASTOLIC BLOOD PRESSURE: 67 MMHG | OXYGEN SATURATION: 97 % | HEIGHT: 74 IN

## 2024-06-03 VITALS
SYSTOLIC BLOOD PRESSURE: 115 MMHG | HEART RATE: 66 BPM | DIASTOLIC BLOOD PRESSURE: 68 MMHG | OXYGEN SATURATION: 98 % | RESPIRATION RATE: 18 BRPM

## 2024-06-03 DIAGNOSIS — R50.9 FEVER, UNSPECIFIED: ICD-10-CM

## 2024-06-03 DIAGNOSIS — R53.83 OTHER FATIGUE: ICD-10-CM

## 2024-06-03 DIAGNOSIS — R63.0 ANOREXIA: ICD-10-CM

## 2024-06-03 DIAGNOSIS — Z20.822 CONTACT WITH AND (SUSPECTED) EXPOSURE TO COVID-19: ICD-10-CM

## 2024-06-03 LAB
ALBUMIN SERPL ELPH-MCNC: 4.3 G/DL — SIGNIFICANT CHANGE UP (ref 3.5–5.2)
ALP SERPL-CCNC: 64 U/L — SIGNIFICANT CHANGE UP (ref 30–115)
ALT FLD-CCNC: 32 U/L — SIGNIFICANT CHANGE UP (ref 0–41)
ANION GAP SERPL CALC-SCNC: 9 MMOL/L — SIGNIFICANT CHANGE UP (ref 7–14)
APPEARANCE UR: ABNORMAL
AST SERPL-CCNC: 44 U/L — HIGH (ref 0–41)
BASOPHILS # BLD AUTO: 0.03 K/UL — SIGNIFICANT CHANGE UP (ref 0–0.2)
BASOPHILS NFR BLD AUTO: 0.7 % — SIGNIFICANT CHANGE UP (ref 0–1)
BILIRUB SERPL-MCNC: 0.7 MG/DL — SIGNIFICANT CHANGE UP (ref 0.2–1.2)
BILIRUB UR-MCNC: NEGATIVE — SIGNIFICANT CHANGE UP
BUN SERPL-MCNC: 11 MG/DL — SIGNIFICANT CHANGE UP (ref 10–20)
CALCIUM SERPL-MCNC: 8.8 MG/DL — SIGNIFICANT CHANGE UP (ref 8.4–10.5)
CHLORIDE SERPL-SCNC: 104 MMOL/L — SIGNIFICANT CHANGE UP (ref 98–110)
CO2 SERPL-SCNC: 24 MMOL/L — SIGNIFICANT CHANGE UP (ref 17–32)
COLOR SPEC: SIGNIFICANT CHANGE UP
CREAT SERPL-MCNC: 1 MG/DL — SIGNIFICANT CHANGE UP (ref 0.7–1.5)
DIFF PNL FLD: NEGATIVE — SIGNIFICANT CHANGE UP
EGFR: 93 ML/MIN/1.73M2 — SIGNIFICANT CHANGE UP
EOSINOPHIL # BLD AUTO: 0 K/UL — SIGNIFICANT CHANGE UP (ref 0–0.7)
EOSINOPHIL NFR BLD AUTO: 0 % — SIGNIFICANT CHANGE UP (ref 0–8)
FLUAV AG NPH QL: SIGNIFICANT CHANGE UP
FLUBV AG NPH QL: SIGNIFICANT CHANGE UP
GLUCOSE SERPL-MCNC: 102 MG/DL — HIGH (ref 70–99)
GLUCOSE UR QL: NEGATIVE MG/DL — SIGNIFICANT CHANGE UP
HCT VFR BLD CALC: 42.3 % — SIGNIFICANT CHANGE UP (ref 42–52)
HGB BLD-MCNC: 14.1 G/DL — SIGNIFICANT CHANGE UP (ref 14–18)
IMM GRANULOCYTES NFR BLD AUTO: 0.2 % — SIGNIFICANT CHANGE UP (ref 0.1–0.3)
KETONES UR-MCNC: ABNORMAL MG/DL
LEUKOCYTE ESTERASE UR-ACNC: ABNORMAL
LYMPHOCYTES # BLD AUTO: 0.64 K/UL — LOW (ref 1.2–3.4)
LYMPHOCYTES # BLD AUTO: 13.9 % — LOW (ref 20.5–51.1)
MCHC RBC-ENTMCNC: 28.4 PG — SIGNIFICANT CHANGE UP (ref 27–31)
MCHC RBC-ENTMCNC: 33.3 G/DL — SIGNIFICANT CHANGE UP (ref 32–37)
MCV RBC AUTO: 85.3 FL — SIGNIFICANT CHANGE UP (ref 80–94)
MONOCYTES # BLD AUTO: 0.39 K/UL — SIGNIFICANT CHANGE UP (ref 0.1–0.6)
MONOCYTES NFR BLD AUTO: 8.5 % — SIGNIFICANT CHANGE UP (ref 1.7–9.3)
NEUTROPHILS # BLD AUTO: 3.54 K/UL — SIGNIFICANT CHANGE UP (ref 1.4–6.5)
NEUTROPHILS NFR BLD AUTO: 76.7 % — HIGH (ref 42.2–75.2)
NITRITE UR-MCNC: NEGATIVE — SIGNIFICANT CHANGE UP
NRBC # BLD: 0 /100 WBCS — SIGNIFICANT CHANGE UP (ref 0–0)
PH UR: 6 — SIGNIFICANT CHANGE UP (ref 5–8)
PLATELET # BLD AUTO: 180 K/UL — SIGNIFICANT CHANGE UP (ref 130–400)
PMV BLD: 9.4 FL — SIGNIFICANT CHANGE UP (ref 7.4–10.4)
POTASSIUM SERPL-MCNC: 3.6 MMOL/L — SIGNIFICANT CHANGE UP (ref 3.5–5)
POTASSIUM SERPL-SCNC: 3.6 MMOL/L — SIGNIFICANT CHANGE UP (ref 3.5–5)
PROT SERPL-MCNC: 6.5 G/DL — SIGNIFICANT CHANGE UP (ref 6–8)
PROT UR-MCNC: SIGNIFICANT CHANGE UP MG/DL
RBC # BLD: 4.96 M/UL — SIGNIFICANT CHANGE UP (ref 4.7–6.1)
RBC # FLD: 13.8 % — SIGNIFICANT CHANGE UP (ref 11.5–14.5)
RSV RNA NPH QL NAA+NON-PROBE: SIGNIFICANT CHANGE UP
SARS-COV-2 RNA SPEC QL NAA+PROBE: SIGNIFICANT CHANGE UP
SODIUM SERPL-SCNC: 137 MMOL/L — SIGNIFICANT CHANGE UP (ref 135–146)
SP GR SPEC: 1.02 — SIGNIFICANT CHANGE UP (ref 1–1.03)
UROBILINOGEN FLD QL: 1 MG/DL — SIGNIFICANT CHANGE UP (ref 0.2–1)
WBC # BLD: 4.61 K/UL — LOW (ref 4.8–10.8)
WBC # FLD AUTO: 4.61 K/UL — LOW (ref 4.8–10.8)

## 2024-06-03 PROCEDURE — 85025 COMPLETE CBC W/AUTO DIFF WBC: CPT

## 2024-06-03 PROCEDURE — 36000 PLACE NEEDLE IN VEIN: CPT

## 2024-06-03 PROCEDURE — 71046 X-RAY EXAM CHEST 2 VIEWS: CPT

## 2024-06-03 PROCEDURE — 36415 COLL VENOUS BLD VENIPUNCTURE: CPT

## 2024-06-03 PROCEDURE — 99284 EMERGENCY DEPT VISIT MOD MDM: CPT

## 2024-06-03 PROCEDURE — 87086 URINE CULTURE/COLONY COUNT: CPT

## 2024-06-03 PROCEDURE — 71046 X-RAY EXAM CHEST 2 VIEWS: CPT | Mod: 26

## 2024-06-03 PROCEDURE — 99283 EMERGENCY DEPT VISIT LOW MDM: CPT | Mod: 25

## 2024-06-03 PROCEDURE — 0241U: CPT

## 2024-06-03 PROCEDURE — 81001 URINALYSIS AUTO W/SCOPE: CPT

## 2024-06-03 PROCEDURE — 80053 COMPREHEN METABOLIC PANEL: CPT

## 2024-06-03 RX ORDER — KETOROLAC TROMETHAMINE 30 MG/ML
15 SYRINGE (ML) INJECTION ONCE
Refills: 0 | Status: DISCONTINUED | OUTPATIENT
Start: 2024-06-03 | End: 2024-06-03

## 2024-06-03 RX ORDER — SODIUM CHLORIDE 9 MG/ML
1000 INJECTION INTRAMUSCULAR; INTRAVENOUS; SUBCUTANEOUS ONCE
Refills: 0 | Status: COMPLETED | OUTPATIENT
Start: 2024-06-03 | End: 2024-06-03

## 2024-06-03 RX ADMIN — SODIUM CHLORIDE 1000 MILLILITER(S): 9 INJECTION INTRAMUSCULAR; INTRAVENOUS; SUBCUTANEOUS at 17:26

## 2024-06-03 NOTE — ED PROVIDER NOTE - PATIENT PORTAL LINK FT
0 You can access the FollowMyHealth Patient Portal offered by [facility] by registering at the following website: [email or web address]. By joining [employer]’s FollowMyHealth portal, you will also be able to view your health information using other applications (apps) compatible with our system.

## 2024-06-03 NOTE — ED ADULT NURSE NOTE - NSFALLUNIVINTERV_ED_ALL_ED
Bed/Stretcher in lowest position, wheels locked, appropriate side rails in place/Call bell, personal items and telephone in reach/Instruct patient to call for assistance before getting out of bed/chair/stretcher/Non-slip footwear applied when patient is off stretcher/[place] to call system/Physically safe environment - no spills, clutter or unnecessary equipment/Purposeful proactive rounding/Room/bathroom lighting operational, light cord in reach

## 2024-06-03 NOTE — ED PROVIDER NOTE - CLINICAL SUMMARY MEDICAL DECISION MAKING FREE TEXT BOX
49-year-old male no significant past medical history with fever as above, other than decreased appetite and mild fatigue patient has no symptoms whatsoever, no recent travel, no recent dental work, physical exam is unremarkable, labs and studies appreciated, consistent with a viral syndrome, will discharge supportive care and PMD follow-up.  Patient and his mother counseled regarding conditions which should prompt return.

## 2024-06-03 NOTE — ED PROVIDER NOTE - NSFOLLOWUPINSTRUCTIONS_ED_ALL_ED_FT
Follow up with PMD in 1-2 days.    Fever    A fever is an increase in the body's temperature above 100.4°F (38°C) or higher. In adults and children older than three months, a brief mild or moderate fever generally has no long-term effect, and it usually does not require treatment. Many times, fevers are the result of viral infections, which are self-resolving.  However, certain symptoms or diagnostic tests may suggest a bacterial infection that may respond to antibiotics. Take medications as directed by your health care provider.    SEEK IMMEDIATE MEDICAL CARE IF YOU OR YOUR CHILD HAVE ANY OF THE FOLLOWING SYMPTOMS : shortness of breath, seizure, rash/stiff neck/headache, severe abdominal pain, persistent vomiting, any signs of dehydration, or if your child has a fever for over five (5) days.

## 2024-06-03 NOTE — ED PROVIDER NOTE - OBJECTIVE STATEMENT
49-year-old male no pertinent past medical history presents for evaluation of fever.  Patient endorses intermittent fever Tmax 105 since last night, improved with Tylenol, no inciting factors.  Associated fatigue and decreased appetite.  Denies headache, neck pain, chest pain, shortness of breath, cough, abdominal pain, dysuria, hematuria, vomiting, diarrhea, rash, nasal congestion, ear pain.

## 2024-06-03 NOTE — ED ADULT TRIAGE NOTE - NS ED NURSE BANDS TYPE
Detail Level: Detailed Consent: The patient's consent was obtained including but not limited to risks of crusting, scabbing, blistering, scarring, darker or lighter pigmentary change, recurrence, incomplete removal and infection. Post-Care Instructions: I reviewed with the patient in detail post-care instructions. Patient is to wear sunprotection, and avoid picking at any of the treated lesions. Pt may apply Vaseline to crusted or scabbing areas. Render Note In Bullet Format When Appropriate: No Show Aperture Variable?: Yes Duration Of Freeze Thaw-Cycle (Seconds): 0 Name band;

## 2024-06-03 NOTE — ED ADULT NURSE NOTE - NSICDXNOPASTSURGICALHX_GEN_ALL_CORE
Patient called to inform author that they have other imaging appointments that need to be scheduled first. They will call the office back later to schedule CSCOPE.    <-- Click to add NO significant Past Surgical History

## 2024-07-16 NOTE — ED PROVIDER NOTE - NS ED ATTENDING STATEMENT MOD
"Spoke with pt, he relays that there is currently no drainage. He thinks every 5 days or twice a week for appointments would be fine. Transportation to clinic is a problem. He can \"barely\" reach foot to do cares. Advised to try to enlist help of family or friends for wound care and we will continue to work on homecare referral.    He has endoform through Sunday but will be out after then and needing an order place, next clinic appointment on 7/24/24 so can order then.     Daisha Mandujano RN, CWOCN       " This was a shared visit with the JASSI. I reviewed and verified the documentation.

## 2024-08-10 NOTE — ED ADULT TRIAGE NOTE - NS ED NURSE BANDS TYPE
This note was copied from a baby's chart.  Lactation visit for mom Alyssia.  Two day old baby David now in SCN to R/O sepsis.  Not able to breastfeed at this time.      Mom has been set up with [facility] pump.  Using 21 mm size flanges.  Fairly comfortable but there is a little room around nipple.  Given a set a 19 mm size inserts to try at next pumping session.  Can decide which are more comfortable and which are more productive for milk making.  Is already expressing around 15 mls total per pumping session.  Encouraged to continue pumping every 2-3 hours.  Using belly band to aid in hands on pumping.   Name band;

## 2024-12-16 ENCOUNTER — EMERGENCY (EMERGENCY)
Facility: HOSPITAL | Age: 49
LOS: 0 days | Discharge: HOME | End: 2024-12-16
Attending: EMERGENCY MEDICINE
Payer: COMMERCIAL

## 2024-12-16 VITALS
HEART RATE: 78 BPM | HEIGHT: 74 IN | SYSTOLIC BLOOD PRESSURE: 107 MMHG | DIASTOLIC BLOOD PRESSURE: 65 MMHG | WEIGHT: 164.91 LBS | OXYGEN SATURATION: 99 % | TEMPERATURE: 98 F | RESPIRATION RATE: 18 BRPM

## 2024-12-16 DIAGNOSIS — R05.1 ACUTE COUGH: ICD-10-CM

## 2024-12-16 PROCEDURE — 99283 EMERGENCY DEPT VISIT LOW MDM: CPT | Mod: 25

## 2024-12-16 PROCEDURE — 71046 X-RAY EXAM CHEST 2 VIEWS: CPT

## 2024-12-16 PROCEDURE — 99284 EMERGENCY DEPT VISIT MOD MDM: CPT

## 2024-12-16 PROCEDURE — 71046 X-RAY EXAM CHEST 2 VIEWS: CPT | Mod: 26

## 2024-12-16 RX ORDER — PREDNISONE 20 MG/1
2 TABLET ORAL
Qty: 8 | Refills: 0
Start: 2024-12-16 | End: 2024-12-19

## 2024-12-16 RX ORDER — ALBUTEROL 90 MCG
2 AEROSOL (GRAM) INHALATION
Qty: 1 | Refills: 0
Start: 2024-12-16

## 2024-12-16 NOTE — ED ADULT NURSE NOTE - TEMPLATE
Video [facility] [employer]  How Does a COVID-19 mRNA Vaccine Work? Some of the COVID-19 vaccines are mRNA vaccines.  This video will show you what an mRNA vaccine is and how it works.     To watch the video:  Scan the [address] [place] [address] code  Using your mobile device, scan the General

## 2024-12-16 NOTE — ED PROVIDER NOTE - PATIENT PORTAL LINK FT
You can access the FollowMyHealth Patient Portal offered by [facility] by registering at the following website: [email or web address]. By joining [facility]’s FollowMyHealth portal, you will also be able to view your health information using other applications (apps) compatible with our system.

## 2024-12-16 NOTE — ED PROVIDER NOTE - PHYSICAL EXAMINATION
GENERAL: Well-developed; well-nourished; in no acute distress.   SKIN: warm, dry  HEAD: Normocephalic; atraumatic.  EYES: PERRLA, EOMI, no conjunctival erythema  ENT: No nasal discharge; airway clear. MMM  NECK: Supple; non tender.  CARD: Regular rate and rhythm. S1, S2 normal; no murmurs, gallops, or rubs.   RESP: Dry cough. LCTAB; No wheezes, rales, rhonchi, or stridor. Speaking in full sentences  ABD: soft, nontender, and nondistended  EXT: Normal ROM.  No LE TTP or edema bilaterally.  NEURO: A/ox3, grossly unremarkable  PSYCH: Cooperative, appropriate.

## 2024-12-16 NOTE — ED PROVIDER NOTE - ATTENDING CONTRIBUTION TO CARE
Patient complains of persisting cough.  He denies fever.  He denies shortness of breath or chest pain.  He denies rash or trauma.  Vital signs noted.  No apparent distress.  There were localized rhonchi to the right base.  Chest x-ray reviewed.  There may be some increased markings corresponding to the rhonchi and chest exam.  Of note, the patient is already completed a course of appropriate antibiotics for pneumonia.  Prednisone and albuterol ordered in case bronchospasm is contributing to the persistent cough.  In my opinion, outpatient follow-up and treatment are medically appropriate.

## 2024-12-16 NOTE — ED ADULT NURSE NOTE - NSFALLUNIVINTERV_ED_ALL_ED
Bed/Stretcher in lowest position, wheels locked, appropriate side rails in place/Call bell, personal items and telephone in reach/Instruct patient to call for assistance before getting out of bed/chair/stretcher/Non-slip footwear applied when patient is off stretcher/[place] to call system/Physically safe environment - no spills, clutter or unnecessary equipment/Purposeful proactive rounding/Room/bathroom lighting operational, light cord in reach

## 2024-12-16 NOTE — ED PROVIDER NOTE - OBJECTIVE STATEMENT
49-year-old male, with no significant past medical history, presents to the ED for dry cough for 2 weeks.  Already finished a course of Z-Kel and has been taking cough syrup without relief.  Denies fever, chills, chest pain, SOB, sore throat, rhinorrhea, nausea, vomiting, diarrhea.

## 2025-03-26 ENCOUNTER — EMERGENCY (EMERGENCY)
Facility: HOSPITAL | Age: 50
LOS: 0 days | Discharge: HOME | End: 2025-03-26
Attending: EMERGENCY MEDICINE
Payer: COMMERCIAL

## 2025-03-26 VITALS
WEIGHT: 220.02 LBS | HEIGHT: 74 IN | HEART RATE: 86 BPM | DIASTOLIC BLOOD PRESSURE: 71 MMHG | RESPIRATION RATE: 19 BRPM | SYSTOLIC BLOOD PRESSURE: 123 MMHG | TEMPERATURE: 99 F | OXYGEN SATURATION: 97 %

## 2025-03-26 VITALS — HEART RATE: 78 BPM | SYSTOLIC BLOOD PRESSURE: 119 MMHG | DIASTOLIC BLOOD PRESSURE: 71 MMHG | TEMPERATURE: 98 F

## 2025-03-26 DIAGNOSIS — J06.9 ACUTE UPPER RESPIRATORY INFECTION, UNSPECIFIED: ICD-10-CM

## 2025-03-26 DIAGNOSIS — R05.9 COUGH, UNSPECIFIED: ICD-10-CM

## 2025-03-26 DIAGNOSIS — R07.89 OTHER CHEST PAIN: ICD-10-CM

## 2025-03-26 LAB
FLUAV AG NPH QL: DETECTED
FLUBV AG NPH QL: SIGNIFICANT CHANGE UP
RSV RNA NPH QL NAA+NON-PROBE: SIGNIFICANT CHANGE UP
SARS-COV-2 RNA SPEC QL NAA+PROBE: SIGNIFICANT CHANGE UP
SOURCE RESPIRATORY: SIGNIFICANT CHANGE UP

## 2025-03-26 PROCEDURE — 99284 EMERGENCY DEPT VISIT MOD MDM: CPT

## 2025-03-26 PROCEDURE — 0241U: CPT

## 2025-03-26 PROCEDURE — 71046 X-RAY EXAM CHEST 2 VIEWS: CPT

## 2025-03-26 PROCEDURE — 99285 EMERGENCY DEPT VISIT HI MDM: CPT | Mod: 25

## 2025-03-26 PROCEDURE — 71046 X-RAY EXAM CHEST 2 VIEWS: CPT | Mod: 26

## 2025-03-26 PROCEDURE — 93005 ELECTROCARDIOGRAM TRACING: CPT

## 2025-03-26 PROCEDURE — 93010 ELECTROCARDIOGRAM REPORT: CPT

## 2025-03-26 NOTE — ED PROVIDER NOTE - OBJECTIVE STATEMENT
Patient c/o cough, low grade fever, chest hurts with coughing and breathing past 3 days, No SOB, no N/V

## 2025-03-26 NOTE — ED ADULT TRIAGE NOTE - CCCP TRG CHIEF CMPLNT
Bed: 05  Expected date:   Expected time:   Means of arrival:   Comments:  Charge    
Per patient, BP has been elevated but has not wanted to start on BP medicines at this time.  ED MD made aware of elevated BP   
flu-like symptoms

## 2025-03-26 NOTE — ED PROVIDER NOTE - CLINICAL SUMMARY MEDICAL DECISION MAKING FREE TEXT BOX
Patient with cough cold congestion and temperature 99, exam as above, imaging appreciated, likely viral, will discharge supportive care and outpatient follow-up, counseled regarding conditions which should prompt return.

## 2025-03-26 NOTE — ED PROVIDER NOTE - PATIENT PORTAL LINK FT
You can access the FollowMyHealth Patient Portal offered by [facility] by registering at the following website: [email or web address]. By joining [facility]’s FollowMyHealth portal, you will also be able to view your health information using other applications (apps) compatible with our system.

## 2025-03-26 NOTE — ED PROVIDER NOTE - CARE PLAN
Principal Discharge DX:	Upper respiratory infection  Secondary Diagnosis:	Cough  Secondary Diagnosis:	Chest pain   1

## 2025-04-29 NOTE — PATIENT PROFILE ADULT - OVER THE PAST TWO WEEKS HAVE YOU FELT DOWN, DEPRESSED OR HOPELESS?
[Fatigue] : no fatigue [Decreased Appetite] : appetite not decreased [Recent Weight Gain (___ Lbs)] : no recent weight gain [Visual Field Defect] : no visual field defect [Dry Eyes] : no dryness [Dysphagia] : no dysphagia [Dysphonia] : no dysphonia [Chest Pain] : no chest pain [Palpitations] : no palpitations [Shortness Of Breath] : no shortness of breath [Nausea] : no nausea [Vomiting] : no vomiting [Polyuria] : no polyuria [Hesistancy] : no hesitancy [Joint Pain] : no joint pain [Acanthosis] : no acanthosis  [Headaches] : no headaches [Tremors] : no tremors [Depression] : no depression [Polydipsia] : no polydipsia [Cold Intolerance] : no cold intolerance [Easy Bleeding] : no ~M tendency for easy bleeding [Easy Bruising] : no tendency for easy bruising no